# Patient Record
Sex: MALE | NOT HISPANIC OR LATINO | RURAL
[De-identification: names, ages, dates, MRNs, and addresses within clinical notes are randomized per-mention and may not be internally consistent; named-entity substitution may affect disease eponyms.]

---

## 2019-01-06 ENCOUNTER — IMMUNIZATION (OUTPATIENT)
Dept: RETAIL CLINIC | Facility: CLINIC | Age: 59
End: 2019-01-06

## 2019-01-06 DIAGNOSIS — Z23 NEED FOR VACCINATION: Primary | ICD-10-CM

## 2021-09-10 ENCOUNTER — TELEMEDICINE (OUTPATIENT)
Dept: FAMILY MEDICINE CLINIC | Facility: TELEHEALTH | Age: 61
End: 2021-09-10

## 2021-09-10 VITALS — TEMPERATURE: 101.3 F

## 2021-09-10 DIAGNOSIS — R50.9 FEVER, UNSPECIFIED FEVER CAUSE: ICD-10-CM

## 2021-09-10 DIAGNOSIS — R05.9 COUGH: ICD-10-CM

## 2021-09-10 DIAGNOSIS — R06.02 MILD SHORTNESS OF BREATH: ICD-10-CM

## 2021-09-10 DIAGNOSIS — U07.1 COVID-19: Primary | ICD-10-CM

## 2021-09-10 PROCEDURE — 99213 OFFICE O/P EST LOW 20 MIN: CPT | Performed by: NURSE PRACTITIONER

## 2021-09-10 RX ORDER — PREDNISONE 10 MG/1
TABLET ORAL DAILY
Qty: 21 EACH | Refills: 0 | Status: SHIPPED | OUTPATIENT
Start: 2021-09-10 | End: 2021-09-16

## 2021-09-10 RX ORDER — LISINOPRIL 10 MG/1
10 TABLET ORAL DAILY
COMMUNITY
End: 2023-03-13 | Stop reason: HOSPADM

## 2021-09-10 RX ORDER — ATORVASTATIN CALCIUM 20 MG/1
20 TABLET, FILM COATED ORAL DAILY
COMMUNITY
End: 2023-03-13 | Stop reason: HOSPADM

## 2021-09-10 RX ORDER — METOPROLOL SUCCINATE 25 MG/1
25 TABLET, EXTENDED RELEASE ORAL DAILY
COMMUNITY
End: 2023-03-13 | Stop reason: HOSPADM

## 2021-09-10 RX ORDER — ALBUTEROL SULFATE 90 UG/1
2 AEROSOL, METERED RESPIRATORY (INHALATION) EVERY 4 HOURS PRN
Qty: 18 G | Refills: 0 | Status: SHIPPED | OUTPATIENT
Start: 2021-09-10 | End: 2023-03-13 | Stop reason: HOSPADM

## 2021-09-10 RX ORDER — AZITHROMYCIN 250 MG/1
TABLET, FILM COATED ORAL
Qty: 6 TABLET | Refills: 0 | Status: SHIPPED | OUTPATIENT
Start: 2021-09-10 | End: 2023-03-13 | Stop reason: HOSPADM

## 2021-09-10 NOTE — PROGRESS NOTES
CHIEF COMPLAINT  Chief Complaint   Patient presents with   • Fever     covid 19   • Cough         HPI  Hughsarah Moses is a 62 y.o. male  presents with complaint of fever that will not break present for 10 days. He reports a diagnosis of covid 19 10 days ago and he has kept an elevated temperature despite tylenol alternated with IBU . His max temp as been 101.3. He reports sleeping most all day with no energy and body aches. His oxygenation level is staying above 90% and he is coughing with yellow production and mild short of breath. He reports little to no wheezing. He was curious about the drug Ivermectin. He thought he might get treated with this and azithromycin and prednisone. He sleeps with a cpap mask for ARIADNE but he has not used an inhaler for the shortness of breath.     Review of Systems   Constitutional: Positive for activity change, fatigue and fever.   HENT: Negative.    Eyes: Negative.    Respiratory: Positive for cough and shortness of breath. Negative for wheezing and stridor.    Cardiovascular: Negative.    Musculoskeletal: Negative.    Skin: Negative.    Allergic/Immunologic: Negative.    Neurological: Negative.    Hematological: Negative.    Psychiatric/Behavioral: Negative.        Past Medical History:   Diagnosis Date   • Hyperlipidemia    • Hypertension        History reviewed. No pertinent family history.    Social History     Socioeconomic History   • Marital status:      Spouse name: Not on file   • Number of children: Not on file   • Years of education: Not on file   • Highest education level: Not on file         Temp (!) 101.3 °F (38.5 °C)     PHYSICAL EXAM  Physical Exam   Constitutional: He is oriented to person, place, and time. He appears well-developed and well-nourished. He does not have a sickly appearance. He does not appear ill. No distress.   HENT:   Head: Normocephalic and atraumatic.   Pulmonary/Chest: Effort normal. No stridor.  No respiratory distress. He no  audible wheeze...  Coughing thru out the visit with mild shortness of breath.    Neurological: He is alert and oriented to person, place, and time.   Psychiatric: He has a normal mood and affect.   Vitals reviewed.      No results found for this or any previous visit.    Diagnoses and all orders for this visit:    1. COVID-19 (Primary)  -     QUESTIONNAIRE SERIES    2. Fever, unspecified fever cause  -     azithromycin (Zithromax Z-Sher) 250 MG tablet; Take 2 tablets by mouth on day 1, then 1 tablet daily on days 2-5  Dispense: 6 tablet; Refill: 0    3. Cough  -     predniSONE (DELTASONE) 10 MG (21) dose pack; Take  by mouth Daily for 6 days.  Dispense: 21 each; Refill: 0    4. Mild shortness of breath  -     albuterol sulfate  (90 Base) MCG/ACT inhaler; Inhale 2 puffs Every 4 (Four) Hours As Needed for Wheezing.  Dispense: 18 g; Refill: 0  -     predniSONE (DELTASONE) 10 MG (21) dose pack; Take  by mouth Daily for 6 days.  Dispense: 21 each; Refill: 0    Continue plan of care with IBU every 6 hours alternating with Tylenol as directed.   Increase fluids and rest.   For worsening s/s go to the ED for evaluation  Treating empiracally today with antibiotic and steroids. Patient to follow up prn worsening s/s or no improvement in 5-7 days.       FOLLOW-UP  As discussed during visit with PCP/[facility] if no improvement or Urgent Care/Emergency Department if worsening of symptoms    Patient verbalizes understanding of medication dosage, comfort measures, instructions for treatment and follow-up.    KVNG Banks  09/10/2021  14:43 EDT    This visit was performed via Telehealth.  This patient has been instructed to follow-up with their primary care provider if their symptoms worsen or the treatment provided does not resolve their illness.

## 2021-09-10 NOTE — PATIENT INSTRUCTIONS
10 Things You Can Do to Manage Your COVID-19 Symptoms at Home  If you have possible or confirmed COVID-19:  1. Stay home except to get medical care.  2. Monitor your symptoms carefully. If your symptoms get worse, call your healthcare provider immediately.  3. Get rest and stay hydrated.  4. If you have a medical appointment, call the healthcare provider ahead of time and tell them that you have or may have COVID-19.  5. For medical emergencies, call 911 and notify the dispatch personnel that you have or may have COVID-19.  6. Cover your cough and sneezes with a tissue or use the inside of your elbow.  7. Wash your hands often with soap and water for at least 20 seconds or clean your hands with an alcohol-based hand  that contains at least 60% alcohol.  8. As much as possible, stay in a specific room and away from other people in your home. Also, you should use a separate bathroom, if available. If you need to be around other people in or outside of the home, wear a mask.  9. Avoid sharing personal items with other people in your household, like dishes, towels, and bedding.  10. Clean all surfaces that are touched often, like counters, tabletops, and doorknobs. Use household cleaning sprays or wipes according to the label instructions.  cdc.gov/coronavirus  07/16/2021  This information is not intended to replace advice given to you by your health care provider. Make sure you discuss any questions you have with your health care provider.  Document Revised: 08/04/2021 Document Reviewed: 08/04/2021  [employer] Patient Education © 2021 [employer]    Fever, Adult         A fever is an increase in your body's temperature. It often means a temperature of 100.4°F (38°C) or higher. Brief mild or moderate fevers often have no long-term effects. They often do not need treatment. Moderate or high fevers may make you feel uncomfortable. Sometimes, they can be a sign of a serious illness or disease. A fever that keeps  coming back or that lasts a long time may cause you to lose water in your body (get dehydrated).  You can take your temperature with a thermometer to see if you have a fever. Temperature can change with:  · Age.  · Time of day.  · Where the thermometer is put in the body. Readings may vary when the thermometer is put:  ? In the mouth (oral).  ? In the butt (rectal).  ? In the ear (tympanic).  ? Under the arm (axillary).  ? On the forehead (temporal).  Follow these instructions at home:  Medicines  · Take over-the-counter and prescription medicines only as told by your doctor. Follow the dosing instructions carefully.  · If you were prescribed an antibiotic medicine, take it as told by your doctor. Do not stop taking it even if you start to feel better.  General instructions  · Watch for any changes in your symptoms. Tell your doctor about them.  · Rest as needed.  · Drink enough fluid to keep your pee (urine) pale yellow.  · Sponge yourself or bathe with room-temperature water as needed. This helps to lower your body temperature. Do not use ice water.  · Do not use too many blankets or wear clothes that are too heavy.  · If your fever was caused by an infection that spreads from person to person (is contagious), such as a cold or the flu:  ? You should stay home from work and public places for at least 24 hours after your fever is gone.  ? Your fever should be gone for at least 24 hours without the need to use medicines.  Contact a doctor if:  · You throw up (vomit).  · You cannot eat or drink without throwing up.  · You have watery poop (diarrhea).  · It hurts when you pee.  · Your symptoms do not get better with treatment.  · You have new symptoms.  · You feel very weak.  Get help right away if:  · You are short of breath or have trouble breathing.  · You are dizzy or you pass out (faint).  · You feel mixed up (confused).  · You have signs of not having enough water in your body, such as:  ? Dark pee, very little  pee, or no pee.  ? Cracked lips.  ? Dry mouth.  ? Sunken eyes.  ? Sleepiness.  ? Weakness.  · You have very bad pain in your belly (abdomen).  · You keep throwing up or having watery poop.  · You have a rash on your skin.  · Your symptoms get worse all of a sudden.  Summary  · A fever is an increase in your body's temperature. It often means a temperature of 100.4°F (38°C) or higher.  · Watch for any changes in your symptoms. Tell your doctor about them.  · Take all medicines only as told by your doctor.  · Do not go to work or other public places if your fever was caused by an illness that can spread to other people.  · Get help right away if you have signs that you do not have enough water in your body.  This information is not intended to replace advice given to you by your health care provider. Make sure you discuss any questions you have with your health care provider.  Document Revised: 06/03/2019 Document Reviewed: 06/03/2019  [employer] Patient Education © 2021 [employer]    Shortness of Breath, Adult  Shortness of breath means you have trouble breathing. Shortness of breath could be a sign of a medical problem.  Follow these instructions at home:    · Watch for any changes in your symptoms.  · Do not use any products that contain nicotine or tobacco, such as cigarettes, e-cigarettes, and chewing tobacco.  · Do not smoke. Smoking can cause shortness of breath. If you need help to quit smoking, ask your doctor.  · Avoid things that can make it harder to breathe, such as:  ? Mold.  ? Dust.  ? Air pollution.  ? Chemical smells.  ? Things that can cause allergy symptoms (allergens), if you have allergies.  · Keep your living space clean. Use products that help remove mold and dust.  · Rest as needed. Slowly return to your normal activities.  · Take over-the-counter and prescription medicines only as told by your doctor. This includes oxygen therapy and inhaled medicines.  · Keep all follow-up visits as told by  your doctor. This is important.  Contact a doctor if:  · Your condition does not get better as soon as expected.  · You have a hard time doing your normal activities, even after you rest.  · You have new symptoms.  Get help right away if:  · Your shortness of breath gets worse.  · You have trouble breathing when you are resting.  · You feel light-headed or you pass out (faint).  · You have a cough that is not helped by medicines.  · You cough up blood.  · You have pain with breathing.  · You have pain in your chest, arms, shoulders, or belly (abdomen).  · You have a fever.  · You cannot walk up stairs.  · You cannot exercise the way you normally do.  These symptoms may represent a serious problem that is an emergency. Do not wait to see if the symptoms will go away. Get medical help right away. Call your local emergency services (911 in the [place] Do not drive yourself to the hospital.  Summary  · Shortness of breath is when you have trouble breathing enough air. It can be a sign of a medical problem.  · Avoid things that make it hard for you to breathe, such as smoking, pollution, mold, and dust.  · Watch for any changes in your symptoms. Contact your doctor if you do not get better or you get worse.  This information is not intended to replace advice given to you by your health care provider. Make sure you discuss any questions you have with your health care provider.  Document Revised: 05/20/2019 Document Reviewed: 05/20/2019  [employer] Patient Education © 2021 [employer]    Cough, Adult  A cough helps to clear your throat and lungs. A cough may be a sign of an illness or another medical condition.  An acute cough may only last 2-3 weeks, while a chronic cough may last 8 or more weeks.  Many things can cause a cough. They include:  · Germs (viruses or bacteria) that attack the airway.  · Breathing in things that bother (irritate) your lungs.  · Allergies.  · Asthma.  · Mucus that runs down the back of your  throat (postnasal drip).  · Smoking.  · Acid backing up from the stomach into the tube that moves food from the mouth to the stomach (gastroesophageal reflux).  · Some medicines.  · Lung problems.  · Other medical conditions, such as heart failure or a blood clot in the lung (pulmonary embolism).  Follow these instructions at home:  Medicines  · Take over-the-counter and prescription medicines only as told by your doctor.  · Talk with your doctor before you take medicines that stop a cough (cough suppressants).  Lifestyle    · Do not smoke, and try not to be around smoke. Do not use any products that contain nicotine or tobacco, such as cigarettes, e-cigarettes, and chewing tobacco. If you need help quitting, ask your doctor.  · Drink enough fluid to keep your pee (urine) pale yellow.  · Avoid caffeine.  · Do not drink alcohol if your doctor tells you not to drink.    General instructions    · Watch for any changes in your cough. Tell your doctor about them.  · Always cover your mouth when you cough.  · Stay away from things that make you cough, such as perfume, candles, campfire smoke, or cleaning products.  · If the air is dry, use a cool mist vaporizer or humidifier in your home.  · If your cough is worse at night, try using extra pillows to raise your head up higher while you sleep.  · Rest as needed.  · Keep all follow-up visits as told by your doctor. This is important.    Contact a doctor if:  · You have new symptoms.  · You cough up pus.  · Your cough does not get better after 2-3 weeks, or your cough gets worse.  · Cough medicine does not help your cough and you are not sleeping well.  · You have pain that gets worse or pain that is not helped with medicine.  · You have a fever.  · You are losing weight and you do not know why.  · You have night sweats.  Get help right away if:  · You cough up blood.  · You have trouble breathing.  · Your heartbeat is very fast.  These symptoms may be an emergency. Do not  wait to see if the symptoms will go away. Get medical help right away. Call your local emergency services (911 in the [place] Do not drive yourself to the hospital.  Summary  · A cough helps to clear your throat and lungs. Many things can cause a cough.  · Take over-the-counter and prescription medicines only as told by your doctor.  · Always cover your mouth when you cough.  · Contact a doctor if you have new symptoms or you have a cough that does not get better or gets worse.  This information is not intended to replace advice given to you by your health care provider. Make sure you discuss any questions you have with your health care provider.  Document Revised: 02/05/2021 Document Reviewed: 01/06/2020  [employer] Patient Education © 2021 [employer]    Cough, Adult  A cough helps to clear your throat and lungs. A cough may be a sign of an illness or another medical condition.  An acute cough may only last 2-3 weeks, while a chronic cough may last 8 or more weeks.  Many things can cause a cough. They include:  Germs (viruses or bacteria) that attack the airway.  Breathing in things that bother (irritate) your lungs.  Allergies.  Asthma.  Mucus that runs down the back of your throat (postnasal drip).  Smoking.  Acid backing up from the stomach into the tube that moves food from the mouth to the stomach (gastroesophageal reflux).  Some medicines.  Lung problems.  Other medical conditions, such as heart failure or a blood clot in the lung (pulmonary embolism).  Follow these instructions at home:  Medicines  Take over-the-counter and prescription medicines only as told by your doctor.  Talk with your doctor before you take medicines that stop a cough (cough suppressants).  Lifestyle    Do not smoke, and try not to be around smoke. Do not use any products that contain nicotine or tobacco, such as cigarettes, e-cigarettes, and chewing tobacco. If you need help quitting, ask your doctor.  Drink enough fluid to keep your  pee (urine) pale yellow.  Avoid caffeine.  Do not drink alcohol if your doctor tells you not to drink.    General instructions    Watch for any changes in your cough. Tell your doctor about them.  Always cover your mouth when you cough.  Stay away from things that make you cough, such as perfume, candles, campfire smoke, or cleaning products.  If the air is dry, use a cool mist vaporizer or humidifier in your home.  If your cough is worse at night, try using extra pillows to raise your head up higher while you sleep.  Rest as needed.  Keep all follow-up visits as told by your doctor. This is important.    Contact a doctor if:  You have new symptoms.  You cough up pus.  Your cough does not get better after 2-3 weeks, or your cough gets worse.  Cough medicine does not help your cough and you are not sleeping well.  You have pain that gets worse or pain that is not helped with medicine.  You have a fever.  You are losing weight and you do not know why.  You have night sweats.  Get help right away if:  You cough up blood.  You have trouble breathing.  Your heartbeat is very fast.  These symptoms may be an emergency. Do not wait to see if the symptoms will go away. Get medical help right away. Call your local emergency services (911 in the [place] Do not drive yourself to the hospital.  Summary  A cough helps to clear your throat and lungs. Many things can cause a cough.  Take over-the-counter and prescription medicines only as told by your doctor.  Always cover your mouth when you cough.  Contact a doctor if you have new symptoms or you have a cough that does not get better or gets worse.  This information is not intended to replace advice given to you by your health care provider. Make sure you discuss any questions you have with your health care provider.  Document Revised: 02/05/2021 Document Reviewed: 01/06/2020  [employer] Patient Education © 2021 [employer]

## 2022-09-13 PROBLEM — I10 HTN (HYPERTENSION): Status: ACTIVE | Noted: 2022-09-13

## 2022-09-13 PROBLEM — R41.89 OTHER SYMPTOMS AND SIGNS INVOLVING COGNITIVE FUNCTIONS AND AWARENESS: Status: ACTIVE | Noted: 2022-09-13

## 2022-09-13 PROBLEM — Z95.5 PRESENCE OF CORONARY ANGIOPLASTY IMPLANT AND GRAFT: Status: ACTIVE | Noted: 2022-09-13

## 2022-09-13 PROBLEM — G47.01 INSOMNIA DUE TO MEDICAL CONDITION: Status: ACTIVE | Noted: 2022-09-13

## 2022-09-13 PROBLEM — J45.21 MILD INTERMITTENT ASTHMA WITH (ACUTE) EXACERBATION: Status: ACTIVE | Noted: 2022-09-13

## 2022-09-13 PROBLEM — M54.50 LOW BACK PAIN: Status: ACTIVE | Noted: 2022-09-13

## 2022-09-13 PROBLEM — I25.10 ATHSCL HEART DISEASE OF NATIVE CORONARY ARTERY W/O ANG PCTRS: Status: ACTIVE | Noted: 2022-09-13

## 2022-09-13 PROBLEM — G47.33 OBSTRUCTIVE SLEEP APNEA: Status: ACTIVE | Noted: 2022-09-13

## 2022-09-13 PROBLEM — D17.1 LIPOMA OF BACK: Status: ACTIVE | Noted: 2022-09-13

## 2022-09-13 PROBLEM — E78.5 HLD (HYPERLIPIDEMIA): Status: ACTIVE | Noted: 2022-09-13

## 2022-09-13 PROBLEM — M10.9 GOUT, UNSPECIFIED: Status: ACTIVE | Noted: 2022-09-13

## 2022-09-13 PROBLEM — M54.16 RADICULOPATHY, LUMBAR REGION: Status: ACTIVE | Noted: 2022-09-13

## 2022-09-13 PROBLEM — I50.32 CHRONIC DIASTOLIC (CONGESTIVE) HEART FAILURE (HCC): Status: ACTIVE | Noted: 2022-09-13

## 2022-09-13 PROBLEM — U09.9 POST COVID-19 CONDITION, UNSPECIFIED: Status: ACTIVE | Noted: 2022-09-13

## 2022-09-13 PROBLEM — E66.9 NON MORBID OBESITY: Status: ACTIVE | Noted: 2022-09-13

## 2022-09-14 ENCOUNTER — OFFICE VISIT (OUTPATIENT)
Dept: FAMILY MEDICINE CLINIC | Facility: CLINIC | Age: 62
End: 2022-09-14

## 2022-09-14 VITALS
DIASTOLIC BLOOD PRESSURE: 80 MMHG | HEIGHT: 70 IN | WEIGHT: 279 LBS | HEART RATE: 101 BPM | BODY MASS INDEX: 39.94 KG/M2 | RESPIRATION RATE: 18 BRPM | TEMPERATURE: 98.7 F | OXYGEN SATURATION: 96 % | SYSTOLIC BLOOD PRESSURE: 130 MMHG

## 2022-09-14 DIAGNOSIS — M25.511 ACUTE PAIN OF RIGHT SHOULDER: Primary | ICD-10-CM

## 2022-09-14 DIAGNOSIS — M25.562 ACUTE PAIN OF LEFT KNEE: ICD-10-CM

## 2022-09-14 PROCEDURE — 99213 OFFICE O/P EST LOW 20 MIN: CPT | Performed by: INTERNAL MEDICINE

## 2022-09-14 RX ORDER — PREDNISONE 20 MG/1
20 TABLET ORAL DAILY
Qty: 10 TABLET | Refills: 0 | Status: SHIPPED | OUTPATIENT
Start: 2022-09-14 | End: 2022-12-23

## 2022-09-14 RX ORDER — ESZOPICLONE 2 MG/1
TABLET, FILM COATED ORAL
COMMUNITY
Start: 2022-08-10

## 2022-09-14 RX ORDER — LISINOPRIL 40 MG/1
TABLET ORAL
COMMUNITY
End: 2022-12-23

## 2022-09-14 RX ORDER — ASPIRIN 81 MG/1
TABLET ORAL
COMMUNITY

## 2022-09-14 RX ORDER — CLOPIDOGREL BISULFATE 75 MG/1
75 TABLET ORAL DAILY
COMMUNITY
Start: 2022-07-12

## 2022-09-14 RX ORDER — ATORVASTATIN CALCIUM 80 MG/1
TABLET, FILM COATED ORAL
COMMUNITY

## 2022-09-14 RX ORDER — METOPROLOL SUCCINATE 50 MG/1
TABLET, EXTENDED RELEASE ORAL
COMMUNITY

## 2022-09-14 RX ORDER — NITROGLYCERIN 0.4 MG/1
0.4 TABLET SUBLINGUAL
COMMUNITY

## 2022-09-14 RX ORDER — BENZONATATE 200 MG/1
200 CAPSULE ORAL 3 TIMES DAILY PRN
COMMUNITY
Start: 2022-08-17 | End: 2022-12-23

## 2022-09-14 NOTE — PROGRESS NOTES
"    Follow Up Office Visit      Date: 2022   Patient Name: Ethan Moses  : 1960   MRN: [id number]     Chief Complaint:    Chief Complaint   Patient presents with   • right shoulder pain   • left knee pain     Patient fell labor day weekend        History of Present Illness: Ethan Moses is a 62 y.o. male who is here today for follow-up of a fall sustained 9 days ago when he slipped on his porch, landing on his right shoulder, also slightly hurting his left knee.  He had significant stiffness in the shoulder and pain, stiffness improving but still having significant discomfort especially with movement of the shoulder such as abduction and rotation.  His left knee is mildly tender but really not a major problem at this stage.  He had been getting some physical therapy for some generalized \"stiffness of the right shoulder without a lot of pain, having just completed his last round of therapy before his fall.  The pain is now significantly flared up now as noted given his fall.  Taking some Tylenol ibuprofen initially ice followed by heat to the shoulder.  No other acute problems..      Subjective      Review of Systems:   Review of Systems    I have reviewed the patients family history, social history, past medical history, past surgical history and have updated it as appropriate.     Medications:     Current Outpatient Medications:   •  aspirin 81 MG EC tablet, Low Dose Aspirin, Disp: , Rfl:   •  atorvastatin (LIPITOR) 80 MG tablet, atorvastatin 80 mg tablet  Take 1 tablet every day by oral route., Disp: , Rfl:   •  benzonatate (TESSALON) 200 MG capsule, Take 200 mg by mouth 3 (Three) Times a Day As Needed., Disp: , Rfl:   •  clopidogrel (PLAVIX) 75 MG tablet, Take 75 mg by mouth Daily., Disp: , Rfl:   •  eszopiclone (LUNESTA) 2 MG tablet, TAKE 1 TABLET IMMEDIATELY BEFORE BEDTIME ONCE A DAY FOR 30 DAYS, Disp: , Rfl:   •  lisinopril (PRINIVIL,ZESTRIL) 40 MG tablet, lisinopril 40 mg tablet  Take 1 " "tablet every day by oral route., Disp: , Rfl:   •  metoprolol succinate XL (TOPROL-XL) 50 MG 24 hr tablet, metoprolol succinate ER 50 mg tablet,extended release 24 hr  Take 1 tablet every day by oral route., Disp: , Rfl:   •  nitroglycerin (NITROSTAT) 0.4 MG SL tablet, Place 0.4 mg under the tongue Every 5 (Five) Minutes As Needed. Take no more than 3 doses in 15 minutes., Disp: , Rfl:   •  predniSONE (DELTASONE) 20 MG tablet, Take 1 tablet by mouth Daily., Disp: 10 tablet, Rfl: 0    Allergies:   Allergies   Allergen Reactions   • Contrast Dye Hives       Objective     Physical Exam: Please see above  Vital Signs:   Vitals:    09/14/22 0851   BP: 130/80   Pulse: 101   Resp: 18   Temp: 98.7 °F (37.1 °C)   SpO2: 96%   Weight: 127 kg (279 lb)   Height: 177.8 cm (70\")     Body mass index is 40.03 kg/m².  Class 3 Severe Obesity (BMI >=40). Obesity-related health conditions include the following: hypertension, coronary heart disease and dyslipidemias. Obesity is unchanged. BMI is is above average; BMI management plan is completed. We discussed low calorie, low carb based diet program, portion control and increasing exercise.       Physical Exam  General: Very pleasant taller statured obese healthy-appearing 62-year-old white male.  Affect very appropriate.  No acute distress.  Neck with no palpable tenderness or masses, increased circumference, range of motion intact with no discomfort,  Lungs clear  Cardiac regular rate rhythm with no murmurs gallops or rubs  Musculoskeletal exam reveals unremarkable neck exam is noted, right shoulder joint margin tenderness to palpation diffusely, more laterally and anteriorly, with positive Neer sign and positive Gaston sign, left shoulder unremarkable, left knee with very minimal tenderness to palpation on the left lateral joint margin, with negative Iain sign, no pain to stressing the medial or lateral collateral ligaments, negative anterior and posterior drawer sign, no " "effusions or swelling, range of motion completely normal.  Procedures    Results:   Labs:   No results found for: HGBA1C, CMP, CBCDIFFPANEL, CREAT, TSH     POCT Results (if applicable):   No results found for this or any previous visit.    Imaging:   No valid procedures specified.     Assessment / Plan      Assessment/Plan:   Diagnoses and all orders for this visit:    1. Acute pain of right shoulder (Primary)  -     predniSONE (DELTASONE) 20 MG tablet; Take 1 tablet by mouth Daily.  Dispense: 10 tablet; Refill: 0  Posttraumatic right shoulder pain.  Previously having \"stiffness\" having completed physical therapy but indicated he really did not have a lot of pain at the time.  Suspicion for rotator cuff injury.  Initially we will treat conservatively with prednisone along with ibuprofen and Tylenol.  If clinically not improving over the next couple weeks, patient to advise me and we will reenroll him in another course of physical therapy, and certainly if this would not be beneficial, we will then proceed to MRI of the right shoulder.  Deferring plain film x-rays at this time given low clinical suspicion of any acute bony injury.  2. Acute pain of left knee  Very minimal nature, improving.  Likely mild strain.  Ibuprofen and Tylenol as needed.      Follow Up:   Return if symptoms worsen or fail to improve.      At [facility], we believe that sharing information builds trust and better relationships. You are receiving this note because you recently visited [facility]. It is possible you will see health information before a provider has talked with you about it. This kind of information can be easy to misunderstand. To help you fully understand what it means for your health, we urge you to discuss this note with your provider.    Vinay Ramesh MD  [facility]   "

## 2022-12-23 ENCOUNTER — OFFICE VISIT (OUTPATIENT)
Dept: FAMILY MEDICINE CLINIC | Facility: CLINIC | Age: 62
End: 2022-12-23

## 2022-12-23 VITALS
WEIGHT: 281.6 LBS | HEIGHT: 70 IN | SYSTOLIC BLOOD PRESSURE: 137 MMHG | HEART RATE: 80 BPM | TEMPERATURE: 97.3 F | DIASTOLIC BLOOD PRESSURE: 84 MMHG | OXYGEN SATURATION: 99 % | BODY MASS INDEX: 40.31 KG/M2

## 2022-12-23 DIAGNOSIS — E66.01 MORBID (SEVERE) OBESITY DUE TO EXCESS CALORIES: ICD-10-CM

## 2022-12-23 DIAGNOSIS — G89.29 CHRONIC MIDLINE LOW BACK PAIN WITH RIGHT-SIDED SCIATICA: ICD-10-CM

## 2022-12-23 DIAGNOSIS — N40.1 BPH ASSOCIATED WITH NOCTURIA: ICD-10-CM

## 2022-12-23 DIAGNOSIS — M54.2 CERVICALGIA: ICD-10-CM

## 2022-12-23 DIAGNOSIS — I50.32 CHRONIC DIASTOLIC (CONGESTIVE) HEART FAILURE: ICD-10-CM

## 2022-12-23 DIAGNOSIS — G62.9 PERIPHERAL POLYNEUROPATHY: ICD-10-CM

## 2022-12-23 DIAGNOSIS — I10 PRIMARY HYPERTENSION: ICD-10-CM

## 2022-12-23 DIAGNOSIS — G47.01 INSOMNIA DUE TO MEDICAL CONDITION: ICD-10-CM

## 2022-12-23 DIAGNOSIS — Z95.5 PRESENCE OF CORONARY ANGIOPLASTY IMPLANT AND GRAFT: ICD-10-CM

## 2022-12-23 DIAGNOSIS — Z11.4 SCREENING FOR HIV (HUMAN IMMUNODEFICIENCY VIRUS): ICD-10-CM

## 2022-12-23 DIAGNOSIS — Z23 NEED FOR PROPHYLACTIC VACCINATION AGAINST STREPTOCOCCUS PNEUMONIAE (PNEUMOCOCCUS): ICD-10-CM

## 2022-12-23 DIAGNOSIS — U09.9 POST-COVID SYNDROME: ICD-10-CM

## 2022-12-23 DIAGNOSIS — Z00.01 ENCOUNTER FOR GENERAL ADULT MEDICAL EXAMINATION WITH ABNORMAL FINDINGS: Primary | ICD-10-CM

## 2022-12-23 DIAGNOSIS — G47.33 OBSTRUCTIVE SLEEP APNEA: ICD-10-CM

## 2022-12-23 DIAGNOSIS — M54.41 CHRONIC MIDLINE LOW BACK PAIN WITH RIGHT-SIDED SCIATICA: ICD-10-CM

## 2022-12-23 DIAGNOSIS — Z12.5 SCREENING FOR PROSTATE CANCER: ICD-10-CM

## 2022-12-23 DIAGNOSIS — I25.10 ATHSCL HEART DISEASE OF NATIVE CORONARY ARTERY W/O ANG PCTRS: ICD-10-CM

## 2022-12-23 DIAGNOSIS — R35.1 BPH ASSOCIATED WITH NOCTURIA: ICD-10-CM

## 2022-12-23 DIAGNOSIS — R53.83 OTHER FATIGUE: ICD-10-CM

## 2022-12-23 DIAGNOSIS — N18.31 STAGE 3A CHRONIC KIDNEY DISEASE: ICD-10-CM

## 2022-12-23 DIAGNOSIS — Z11.59 NEED FOR HEPATITIS C SCREENING TEST: ICD-10-CM

## 2022-12-23 DIAGNOSIS — E78.2 MIXED HYPERLIPIDEMIA: ICD-10-CM

## 2022-12-23 DIAGNOSIS — Z87.39 HISTORY OF GOUT: ICD-10-CM

## 2022-12-23 PROBLEM — E66.9 OBESITY (BMI 35.0-39.9 WITHOUT COMORBIDITY): Status: ACTIVE | Noted: 2022-12-20

## 2022-12-23 PROBLEM — E66.9 NON MORBID OBESITY: Status: RESOLVED | Noted: 2022-09-13 | Resolved: 2022-12-23

## 2022-12-23 PROBLEM — E66.9 OBESITY (BMI 35.0-39.9 WITHOUT COMORBIDITY): Status: RESOLVED | Noted: 2022-12-20 | Resolved: 2022-12-23

## 2022-12-23 PROCEDURE — 90471 IMMUNIZATION ADMIN: CPT | Performed by: INTERNAL MEDICINE

## 2022-12-23 PROCEDURE — 99396 PREV VISIT EST AGE 40-64: CPT | Performed by: INTERNAL MEDICINE

## 2022-12-23 PROCEDURE — 90677 PCV20 VACCINE IM: CPT | Performed by: INTERNAL MEDICINE

## 2022-12-23 PROCEDURE — 36415 COLL VENOUS BLD VENIPUNCTURE: CPT | Performed by: INTERNAL MEDICINE

## 2022-12-23 RX ORDER — LISINOPRIL AND HYDROCHLOROTHIAZIDE 25; 20 MG/1; MG/1
1 TABLET ORAL DAILY
COMMUNITY

## 2022-12-23 RX ORDER — GABAPENTIN 300 MG/1
300 CAPSULE ORAL 3 TIMES DAILY
COMMUNITY
Start: 2022-12-20 | End: 2023-01-19

## 2022-12-23 NOTE — PROGRESS NOTES
Male Physical Note      Date: 2022   Patient Name: Ethan Moses  : 1960   MRN: [id number]     Chief Complaint:    Chief Complaint   Patient presents with   • Annual Exam     WANTS BLOOD WORK DONE       History of Present Illness: Ethan Moses is a 62 y.o. male who is here today for their annual health maintenance and physical.  Pleasant 62-year-old white male previous patient of Dr. Tyshawn Ramesh who subsequently retired, coming in for routine complete physical.  He was diagnosed with COVID-19 in 2021 and subsequent long COVID syndrome manifested by some brain fog, headaches, and initially having some mild respiratory symptoms.  He was referred initially to Dr. Nohemy Wing, and local neurologist, and ultimately Dr. Joana Romero of  neurology, given his long COVID symptoms.  Diagnosed with a nonspecific peripheral neuropathy in his lower extremities manifested by some tingling and burning in his feet, just recently having been prescribed gabapentin 300 mg to take up to 3 times daily but initially nightly.  He has been getting some physical therapy focusing on cervicalgia which has helped his headaches and he does believe also helping his brain fog.  Dr. Romero has requested some labs to pursue his etiology of his neuropathy, patient bring in a copy of these labs requested.  Patient unrelated has a history of coronary artery disease with stent placement having undergone left heart catheterization with Dr. Connolly in 2021 revealing multivessel noncritical disease with prior RCA/PDA stent placed revealing patency, normal ejection fraction.  Did recently see the office of Dr. Post of cardiology where he had a switching lisinopril over to lisinopril/HCTZ given some swelling in his hand.  He is not having any chest pains palpitations or dyspnea at this time and no significant dependent edema.  He did have a CT scan performed of his chest in 2022 performed given a prior COVID-pneumonia and some  dyspnea, this revealing hazy bibasilar groundglass opacities possibly reflecting sequelae of COVID-19 pneumonia.  There was no specific protocol for follow-up on this.  He is again not having any dyspnea or cough at this time.  History of hypertension with blood pressures generally normal.  History of chronic kidney disease.  Has never been diagnosed with diabetes.  Does have chronic insomnia for which he has been given sporadic Lunesta 2 mg nightly, patient only using it several times weekly, also history of ARIADNE on CPAP machine followed by Dr. Post for this as well as his cardiology, Dr. Post imminently retiring, history of obesity for which he has no specific exercise protocol and does have a moderately suboptimal diet.  History of some chronic lumbago with intermittent right sciatica not lifestyle limiting.  Recently had been seen for some traumatic right shoulder pain which is essentially almost completely resolved.      Subjective      Review of Systems:   Review of Systems    Past Medical History, Social History, Family History and Care Team were all reviewed with patient and updated as appropriate.     Medications:     Current Outpatient Medications:   •  aspirin 81 MG EC tablet, Low Dose Aspirin, Disp: , Rfl:   •  atorvastatin (LIPITOR) 80 MG tablet, atorvastatin 80 mg tablet  Take 1 tablet every day by oral route., Disp: , Rfl:   •  clopidogrel (PLAVIX) 75 MG tablet, Take 75 mg by mouth Daily., Disp: , Rfl:   •  eszopiclone (LUNESTA) 2 MG tablet, TAKE 1 TABLET IMMEDIATELY BEFORE BEDTIME ONCE A DAY FOR 30 DAYS, Disp: , Rfl:   •  gabapentin (NEURONTIN) 300 MG capsule, Take 300 mg by mouth 3 (Three) Times a Day., Disp: , Rfl:   •  lisinopril-hydrochlorothiazide (PRINZIDE,ZESTORETIC) 20-25 MG per tablet, Take 1 tablet by mouth Daily., Disp: , Rfl:   •  metoprolol succinate XL (TOPROL-XL) 50 MG 24 hr tablet, metoprolol succinate ER 50 mg tablet,extended release 24 hr  Take 1 tablet every day by oral route.,  Disp: , Rfl:   •  nitroglycerin (NITROSTAT) 0.4 MG SL tablet, Place 0.4 mg under the tongue Every 5 (Five) Minutes As Needed. Take no more than 3 doses in 15 minutes., Disp: , Rfl:     Allergies:   Allergies   Allergen Reactions   • Contrast Dye Hives       Immunizations:  Health Maintenance Summary          Overdue - ZOSTER VACCINE (1 of 2) Overdue - never done    No completion, postpone, or frequency change history exists for this topic.          Ordered - HEPATITIS C SCREENING (Once) Ordered on 12/23/2022    No completion, postpone, or frequency change history exists for this topic.          Overdue - ANNUAL PHYSICAL (Yearly) Overdue - never done    No completion, postpone, or frequency change history exists for this topic.          Overdue - COVID-19 Vaccine (3 - Booster for Moderna series) Overdue since 3/21/2022    01/24/2022  Imm Admin: COVID-19 (MODERNA) 1st, 2nd, 3rd Dose Only    12/24/2021  Imm Admin: COVID-19 (MODERNA) 1st, 2nd, 3rd Dose Only          Overdue - INFLUENZA VACCINE (Yearly - August to March) Overdue since 8/1/2022 11/04/2019  Imm Admin: Influenza, Unspecified    01/06/2019  Imm Admin: [place] =>18yrs          Ordered - LIPID PANEL (Yearly) Ordered on 12/23/2022    No completion, postpone, or frequency change history exists for this topic.          COLORECTAL CANCER SCREENING (COLOGUARD - Every 3 Years) Next due on 1/27/2024 01/27/2021  SCANNED - COLOGUARD          TDAP/TD VACCINES (2 - Td or Tdap) Next due on 11/4/2029 11/04/2019  Imm Admin: Tdap          Pneumococcal Vaccine 0-64 (Series Information) Completed    12/23/2022  Imm Admin: Pneumococcal Conjugate 20-Valent (PCV20)                Orders Placed This Encounter   Procedures   • Pneumococcal Conjugate Vaccine 20-Valent All       Colorectal Screening:   Cologuard 1/2021 normal  Last Completed Colonoscopy          COLORECTAL CANCER SCREENING (COLOGUARD - Every 3 Years) Next due on 1/27/2024 01/27/2021  SCANNED -  "COLOGUARD              CT for Smoker (Age 50-80, 20pk yr within last 15 years): N/A  Bone Density/DEXA (high risk): N/A  Hep C (Age 18-79 once): Pending  HIV (Age 15-65 once) : Pending  PSA (Over age 50, C Level Recommendation): Pending  US Aorta (For male smokers, age 65): N/A  A1c: No results found for: HGBA1C pending  Lipid panel: No results found for: LABLIPI pending    The ASCVD Risk score (Bebeto BOSTON, et al., 2019) failed to calculate for the following reasons:    Cannot find a previous HDL lab    Cannot find a previous total cholesterol lab    Tobacco Use: Low Risk    • Smoking Tobacco Use: Never   • Smokeless Tobacco Use: Never   • Passive Exposure: Not on file       Social History     Substance and Sexual Activity   Alcohol Use Yes    Comment: In the 1980s, and early 1990s, he did use excessive alcohol.  Currently now a weekend drinker, typically 7-8 ounces of bourbon over the weekend        Social History     Substance and Sexual Activity   Drug Use Not Currently        Diet/Physical activity: Limited physical activity, suboptimal diet    Sexual health: No major concern, contraception used    PHQ-2 Depression Screening  PHQ-9 Total Score:   Denies being depressed       Objective     Physical Exam:  Vital Signs:   Vitals:    12/23/22 1045   BP: 137/84   BP Location: Left arm   Patient Position: Sitting   Cuff Size: Large Adult   Pulse: 80   Temp: 97.3 °F (36.3 °C)   TempSrc: Temporal   SpO2: 99%   Weight: 128 kg (281 lb 9.6 oz)   Height: 177.8 cm (70\")     Body mass index is 40.41 kg/m².     Physical Exam  Vitals and nursing note reviewed.   Constitutional:       Appearance: Normal appearance. He is obese.      Comments: No acute distress, alert and oriented, fluent speech average statured, BMI 40.4 with significant obesity   HENT:      Head: Normocephalic and atraumatic.      Right Ear: Tympanic membrane, ear canal and external ear normal.      Left Ear: Tympanic membrane, ear canal and external ear normal. "      Nose: Nose normal.      Mouth/Throat:      Mouth: Mucous membranes are moist.      Pharynx: Oropharynx is clear.   Eyes:      Extraocular Movements: Extraocular movements intact.      Conjunctiva/sclera: Conjunctivae normal.      Pupils: Pupils are equal, round, and reactive to light.   Neck:      Vascular: No carotid bruit.      Comments: Mild decreased range of motion to rotation and extension with good flexion  Cardiovascular:      Rate and Rhythm: Normal rate and regular rhythm.      Pulses: Normal pulses.      Heart sounds: Normal heart sounds. No murmur heard.    No friction rub. No gallop.      Comments: 2+ carotids without bruits, 2+ radial pulses, 2+ femoral pulses without bruits, 2+ bipedal pulses with good perfusion and no significant pitting edema having some very mild nonpitting stasis edema of his ankles bilaterally  Pulmonary:      Effort: Pulmonary effort is normal.      Breath sounds: Normal breath sounds.      Comments: No cough wheeze or dyspnea  Abdominal:      General: Bowel sounds are normal.      Palpations: Abdomen is soft.      Comments: Moderately obese soft and nontender with no organomegaly or masses and normal bowel sounds   Genitourinary:     Comments: Patient declines  or rectal examination  Musculoskeletal:         General: Tenderness present. No swelling or deformity. Normal range of motion.      Cervical back: Normal range of motion and neck supple. No tenderness.      Right lower leg: No edema.      Left lower leg: No edema.      Comments: Mild tenderness palpation on the midline of the spine at the junction of the lower thoracic and upper lumbar region   Lymphadenopathy:      Cervical: No cervical adenopathy.   Skin:     General: Skin is warm and dry.      Capillary Refill: Capillary refill takes less than 2 seconds.      Findings: No lesion or rash.   Neurological:      General: No focal deficit present.      Mental Status: He is alert and oriented to person, place, and  time. Mental status is at baseline.      Comments: Bipedal exam with 2+ DP and 2+ PT pulses bilaterally, mild nonpitting stasis edema of the ankles, decreased sensation of both feet to fine touch vibration pinprick, more so on the left than right foot, motor exam normal   Psychiatric:         Mood and Affect: Mood normal.         Behavior: Behavior normal.         Thought Content: Thought content normal.         Judgment: Judgment normal.          POCT Results (if applicable):   No results found for this or any previous visit.    Procedures    Assessment / Plan      Assessment/Plan:   Diagnoses and all orders for this visit:    1. Encounter for general adult medical examination with abnormal findings (Primary)  -     TSH Rfx On Abnormal To Free T4; Future  -     CBC & Differential; Future  -     Comprehensive Metabolic Panel; Future  -     Lipid Panel; Future  -     Hepatitis C Antibody; Future  -     HIV-1 / O / 2 Ag / Antibody 4th Generation; Future  -     Hemoglobin A1c; Future  -     Urinalysis With Culture If Indicated -; Future  -     Vitamin B12; Future  -     Folate; Future  -     PSA Screen; Future  -     Cancel: Methylmalonic Acid, Serum; Future  -     Cancel: Protein Elec + Interp, Serum; Future  -     Cancel: KAMLESH + PE; Future  -     Uric Acid; Future  -     Uric Acid  -     Cancel: KAMLESH + PE  -     Cancel: Protein Elec + Interp, Serum  -     Cancel: Methylmalonic Acid, Serum  -     PSA Screen  -     Folate  -     Vitamin B12  -     Hemoglobin A1c  -     HIV-1 / O / 2 Ag / Antibody 4th Generation  -     Hepatitis C Antibody  -     Lipid Panel  -     Urinalysis With Culture If Indicated -  -     Comprehensive Metabolic Panel  -     CBC & Differential  -     TSH Rfx On Abnormal To Free T4  -     Cancel: KAMLESH + PE; Future  -     Cancel: Protein Elec + Interp, Serum; Future  -     Cancel: Methylmalonic Acid, Serum; Future  -     Protein Elec + Interp, Serum; Future  -     Methylmalonic Acid, Serum; Future  -      KAMLESH + PE; Future  -     KAMLESH + PE  -     Methylmalonic Acid, Serum  -     Protein Elec + Interp, Serum    2. Athscl heart disease of native coronary artery w/o ang pctrs  History of multi vessel noncritical coronary disease with prior stent, most recent LHC from 11/2021 revealing patent stent and normal ejection fraction.  Patient is asymptomatic, continue risk factor modification including use of statin, beta-blocker, ACE inhibitor, Plavix and aspirin along with Nitrostat as needed, not requiring.  Patient indicates she just recently had an EKG performed through the office of Dr. Post, and given lack of symptoms I will defer on obtaining an EKG today simply obtaining a copy of the recent tracing.  3. Presence of coronary angioplasty implant and graft  Refer to above, on aspirin and Plavix.  Continue his risk factor modification.  4. Chronic diastolic (congestive) heart failure (HCC)  Recent initiation of low-dose lisinopril purely for some edema in the hands.  There is no evidence of clinical volume overload at this time.  5. Primary hypertension  Satisfactory control acutely as well as chronically, currently on a regimen of metoprolol XL 50 mg daily, and recent change in lisinopril 40 mg daily over the lisinopril/HCTZ 20/25 mg daily.  Continue current monitoring.  6. Mixed hyperlipidemia  Taking atorvastatin 80 mg nightly.  Update lipid profile  7. Stage 3a chronic kidney disease (HCC)  -     Comprehensive Metabolic Panel; Future  -     Comprehensive Metabolic Panel  Most recent creatinine 1.4 with a GFR 54 9/2021 with no more recent labs available for my perusal.  Is taking ACE inhibitor and just had addition of low-dose lisinopril.  Update renal function.  8. Morbid (severe) obesity due to excess calories (HCC)  Luis Daniel discussion with patient regarding need to initiate regular daily exercise as well as pursue a low calorie healthy balanced diet in order to reduce his risks.  9. History of gout  -     Uric Acid;  Future  -     Uric Acid  History of prior gouty arthritis in his left foot.  Update uric acid.  10. Peripheral polyneuropathy  -     Hemoglobin A1c; Future  -     Vitamin B12; Future  -     Folate; Future  -     Cancel: Methylmalonic Acid, Serum; Future  -     Cancel: Protein Elec + Interp, Serum; Future  -     Cancel: KAMLESH + PE; Future  -     Cancel: KAMLESH + PE  -     Cancel: Protein Elec + Interp, Serum  -     Cancel: Methylmalonic Acid, Serum  -     Folate  -     Vitamin B12  -     Hemoglobin A1c  Etiology uncertain, having seen initially Dr. Nohemy Wing and more recently Dr. Joana Romero of  neurology.  Check labs as above, some of which were requested by neurology.  Just recently started on gabapentin 300 mg was prescribed 3 times daily by Dr. Romero, though I did advise that he initially take nightly, then gradually as tolerated increase up to 3 times daily, though if he has excessive daytime sedation he may take 2 tablets at bedtime instead of a daily dose  11. Obstructive sleep apnea  Patient follows with Dr. Post for his ARIADNE on CPAP machine.  12. Insomnia due to medical condition  Chronic problem, having been prescribed Lunesta 2 mg nightly which he takes only sporadically, patient advised that he needs to taper off the Lunesta, to spitting he will get better quality sleep also with the addition of gabapentin as noted above.  13. Post-COVID syndrome  Initial diagnosis of COVID-19 in 9/2021 having had fatigue brain fog and some dyspnea.  Did have a CT scan of his chest in 6/2022 which revealed some nonspecific bibasilar groundglass opacities consistent with sequelae of a viral pneumonia, with no specific formal protocol for follow-up.  I discussed pros and cons of pursuing 6-month follow-up study, with patient indicating has not had any dyspnea, having normal oxygenation and clear lungs.  I think it is reasonable to hold off on any further imaging unless he were to develop any recurrent related symptoms.  14.  Other fatigue  -     TSH Rfx On Abnormal To Free T4; Future  -     CBC & Differential; Future  -     CBC & Differential  -     TSH Rfx On Abnormal To Free T4  Nonspecific, occurring post COVID-19 related to long COVID but also having multiple other potential etiologies including his ARIADNE, sequelae from obesity, etc.  Check appropriate screening labs  15. BPH associated with nocturia  -     PSA Screen; Future  -     PSA Screen  Minimally symptomatic at this time.  Advise if becomes more problematic at which point we can prescribe an alpha-blocker  16. Chronic midline low back pain with right-sided sciatica  Intermittent symptoms, not lifestyle limiting.  Treat with Tylenol equivalent  17. Cervicalgia  Improved status post physical therapy.  18. Screening for prostate cancer  -     PSA Screen; Future  -     PSA Screen  Check PSA screen, noting the most recent screen that I can detect was from 12/2020 with a value of 2.26.  19. Need for hepatitis C screening test  -     Hepatitis C Antibody; Future  -     Hepatitis C Antibody    20. Screening for HIV (human immunodeficiency virus)  -     HIV-1 / O / 2 Ag / Antibody 4th Generation; Future  -     HIV-1 / O / 2 Ag / Antibody 4th Generation    21. Need for prophylactic vaccination against Streptococcus pneumoniae (pneumococcus)  -     Pneumococcal Conjugate Vaccine 20-Valent All  Patient indicates she is current with flu vaccine this fall, believes he has had a Shingrix vaccine series outside this office, has had COVID-19 vaccine x2 and is advised to obtain the bivalent booster.       Healthcare Maintenance:  Counseling provided based on age appropriate USPSTF guidelines.  Class 3 Severe Obesity (BMI >=40). Obesity-related health conditions include the following: obstructive sleep apnea, hypertension, coronary heart disease, dyslipidemias and osteoarthritis. Obesity is unchanged. BMI is is above average; BMI management plan is completed. We discussed low calorie, low carb  based diet program, portion control and increasing exercise.    Ethantonya Dysoncell voices understanding and acceptance of this advice and will call back with any further questions or concerns. AVS with preventive healthcare tips printed for patient.     Vaccine Counseling:  “Discussed risks/benefits to vaccination, reviewed components of the vaccine, discussed VIS, discussed informed consent, informed consent obtained. Patient/Parent was allowed to accept or refuse vaccine. Questions answered to satisfactory state of patient/Parent. We reviewed typical age appropriate and seasonally appropriate vaccinations. Reviewed immunization history and updated state vaccination form as needed. Patient was counseled on Prevnar 20      Follow Up:   Return in about 1 year (around 12/23/2023) for Labs today, Annual physical.    At [facility], we believe that sharing information builds trust and better relationships. You are receiving this note because you recently visited [facility]. It is possible you will see health information before a provider has talked with you about it. This kind of information can be easy to misunderstand. To help you fully understand what it means for your health, we urge you to discuss this note with your provider.    Vinay Ramesh MD  [facility]

## 2022-12-23 NOTE — PROGRESS NOTES
Venipuncture Blood Specimen Collection  Venipuncture performed in left arm by Amanda Oviedo MA with good hemostasis. Patient tolerated the procedure well without complications.   12/23/22   Amanda Oviedo MA

## 2022-12-24 LAB
ALBUMIN SERPL-MCNC: 5 G/DL (ref 3.8–4.8)
ALBUMIN/GLOB SERPL: 2 {RATIO} (ref 1.2–2.2)
ALP SERPL-CCNC: 84 IU/L (ref 44–121)
ALT SERPL-CCNC: 27 IU/L (ref 0–44)
APPEARANCE UR: CLEAR
AST SERPL-CCNC: 26 IU/L (ref 0–40)
BACTERIA #/AREA URNS HPF: NORMAL /[HPF]
BASOPHILS # BLD AUTO: 0.1 X10E3/UL (ref 0–0.2)
BASOPHILS NFR BLD AUTO: 1 %
BILIRUB SERPL-MCNC: 0.7 MG/DL (ref 0–1.2)
BILIRUB UR QL STRIP: NEGATIVE
BUN SERPL-MCNC: 18 MG/DL (ref 8–27)
BUN/CREAT SERPL: 13 (ref 10–24)
CALCIUM SERPL-MCNC: 9.7 MG/DL (ref 8.6–10.2)
CASTS URNS QL MICRO: NORMAL /LPF
CHLORIDE SERPL-SCNC: 101 MMOL/L (ref 96–106)
CHOLEST SERPL-MCNC: 163 MG/DL (ref 100–199)
CO2 SERPL-SCNC: 22 MMOL/L (ref 20–29)
COLOR UR: YELLOW
CREAT SERPL-MCNC: 1.34 MG/DL (ref 0.76–1.27)
EGFRCR SERPLBLD CKD-EPI 2021: 59 ML/MIN/1.73
EOSINOPHIL # BLD AUTO: 0.3 X10E3/UL (ref 0–0.4)
EOSINOPHIL NFR BLD AUTO: 3 %
EPI CELLS #/AREA URNS HPF: NORMAL /HPF (ref 0–10)
ERYTHROCYTE [DISTWIDTH] IN BLOOD BY AUTOMATED COUNT: 12.4 % (ref 11.6–15.4)
FOLATE SERPL-MCNC: 16.1 NG/ML
GLOBULIN SER CALC-MCNC: 2.5 G/DL (ref 1.5–4.5)
GLUCOSE SERPL-MCNC: 113 MG/DL (ref 70–99)
GLUCOSE UR QL STRIP: NEGATIVE
HBA1C MFR BLD: 6 % (ref 4.8–5.6)
HCT VFR BLD AUTO: 46.4 % (ref 37.5–51)
HCV AB S/CO SERPL IA: <0.1 S/CO RATIO (ref 0–0.9)
HDLC SERPL-MCNC: 34 MG/DL
HGB BLD-MCNC: 15.7 G/DL (ref 13–17.7)
HGB UR QL STRIP: NEGATIVE
HIV 1+2 AB+HIV1 P24 AG SERPL QL IA: NON REACTIVE
IMM GRANULOCYTES # BLD AUTO: 0.1 X10E3/UL (ref 0–0.1)
IMM GRANULOCYTES NFR BLD AUTO: 1 %
KETONES UR QL STRIP: NEGATIVE
LDLC SERPL CALC-MCNC: 81 MG/DL (ref 0–99)
LEUKOCYTE ESTERASE UR QL STRIP: NEGATIVE
LYMPHOCYTES # BLD AUTO: 2.6 X10E3/UL (ref 0.7–3.1)
LYMPHOCYTES NFR BLD AUTO: 24 %
MCH RBC QN AUTO: 30.8 PG (ref 26.6–33)
MCHC RBC AUTO-ENTMCNC: 33.8 G/DL (ref 31.5–35.7)
MCV RBC AUTO: 91 FL (ref 79–97)
MICRO URNS: NORMAL
MICRO URNS: NORMAL
MONOCYTES # BLD AUTO: 0.9 X10E3/UL (ref 0.1–0.9)
MONOCYTES NFR BLD AUTO: 9 %
NEUTROPHILS # BLD AUTO: 6.8 X10E3/UL (ref 1.4–7)
NEUTROPHILS NFR BLD AUTO: 62 %
NITRITE UR QL STRIP: NEGATIVE
PH UR STRIP: 5 [PH] (ref 5–7.5)
PLATELET # BLD AUTO: 274 X10E3/UL (ref 150–450)
POTASSIUM SERPL-SCNC: 4.3 MMOL/L (ref 3.5–5.2)
PROT SERPL-MCNC: 7.5 G/DL (ref 6–8.5)
PROT UR QL STRIP: NEGATIVE
PSA SERPL-MCNC: 2.5 NG/ML (ref 0–4)
RBC # BLD AUTO: 5.1 X10E6/UL (ref 4.14–5.8)
RBC #/AREA URNS HPF: NORMAL /HPF (ref 0–2)
SODIUM SERPL-SCNC: 141 MMOL/L (ref 134–144)
SP GR UR STRIP: 1.02 (ref 1–1.03)
TRIGL SERPL-MCNC: 289 MG/DL (ref 0–149)
TSH SERPL DL<=0.005 MIU/L-ACNC: 2.83 UIU/ML (ref 0.45–4.5)
URATE SERPL-MCNC: 8.1 MG/DL (ref 3.8–8.4)
URINALYSIS REFLEX: NORMAL
UROBILINOGEN UR STRIP-MCNC: 0.2 MG/DL (ref 0.2–1)
VIT B12 SERPL-MCNC: 350 PG/ML (ref 232–1245)
VLDLC SERPL CALC-MCNC: 48 MG/DL (ref 5–40)
WBC # BLD AUTO: 10.7 X10E3/UL (ref 3.4–10.8)
WBC #/AREA URNS HPF: NORMAL /HPF (ref 0–5)

## 2023-01-01 LAB
ALBUMIN SERPL ELPH-MCNC: 3.9 G/DL (ref 2.9–4.4)
ALBUMIN SERPL ELPH-MCNC: NORMAL G/DL
ALBUMIN/GLOB SERPL: 1.2 {RATIO} (ref 0.7–1.7)
ALPHA1 GLOB SERPL ELPH-MCNC: 0.3 G/DL (ref 0–0.4)
ALPHA1 GLOB SERPL ELPH-MCNC: NORMAL G/DL
ALPHA2 GLOB SERPL ELPH-MCNC: 1 G/DL (ref 0.4–1)
ALPHA2 GLOB SERPL ELPH-MCNC: NORMAL G/DL
B-GLOBULIN SERPL ELPH-MCNC: 1.4 G/DL (ref 0.7–1.3)
B-GLOBULIN SERPL ELPH-MCNC: NORMAL G/DL
GAMMA GLOB SERPL ELPH-MCNC: 0.9 G/DL (ref 0.4–1.8)
GAMMA GLOB SERPL ELPH-MCNC: NORMAL G/DL
GLOBULIN SER-MCNC: 3.5 G/DL (ref 2.2–3.9)
IGA SERPL-MCNC: 343 MG/DL (ref 61–437)
IGG SERPL-MCNC: 997 MG/DL (ref 603–1613)
IGM SERPL-MCNC: 37 MG/DL (ref 20–172)
INTERPRETATION SERPL IEP-IMP: ABNORMAL
LABORATORY COMMENT REPORT: ABNORMAL
LABORATORY COMMENT REPORT: NORMAL
M PROTEIN SERPL ELPH-MCNC: ABNORMAL G/DL
METHYLMALONATE SERPL-SCNC: 305 NMOL/L (ref 0–378)
PROT SERPL-MCNC: 7.4 G/DL (ref 6–8.5)

## 2023-01-03 ENCOUNTER — TELEPHONE (OUTPATIENT)
Dept: FAMILY MEDICINE CLINIC | Facility: CLINIC | Age: 63
End: 2023-01-03
Payer: COMMERCIAL

## 2023-01-03 NOTE — TELEPHONE ENCOUNTER
Phone conversation with patient regarding results of his recently obtained lab testing as follows:    TSH normal  CBC normal  CMP with creatinine 1.34, GFR 59, noting previous creatinine 1.4 and GFR 54 9/2021, glucose 113, otherwise unremarkable  Urinalysis normal  Uric acid 8.1, mildly elevated   B12, folic acid, methylmalonic acid and homocystine all normal  Hemoglobin 6.0% versus previous value of 5.8% in 12/2020  HIV and hepatitis C negative  Total cholesterol 163, triglyceride 289, HDL 34, LDL 81  Serum protein electrophoresis with no monoclonal spikes  PSA 2.5 versus previous value 2.26 in 12/2020    Assessment/medical  1.  Chronic kidney disease stage IIIa.  On lisinopril update testing in 6 months.  2.  Prediabetes, stable.  Emphasize healthy lifestyle and plan to repeat testing in 6 months.  3.  Dyslipidemia with overall picture of, but generally satisfactory.  Continue his atorvastatin 80 mg nightly.  4.  Neuropathy.  Probably related to prediabetes with negative testing otherwise.  5.  Remainder of laboratory testing satisfactory.  6.  Advised to follow-up in 6 months which point we will repeat his renal function and another hemoglobin A1c.

## 2023-03-10 PROBLEM — I25.10 CAD (CORONARY ARTERY DISEASE): Status: ACTIVE | Noted: 2023-03-10

## 2023-03-10 PROBLEM — G47.33 OSA (OBSTRUCTIVE SLEEP APNEA): Status: ACTIVE | Noted: 2023-03-10

## 2023-03-10 PROBLEM — I10 HYPERTENSION: Status: ACTIVE | Noted: 2023-03-10

## 2023-03-10 PROBLEM — E78.5 HYPERLIPIDEMIA: Status: ACTIVE | Noted: 2023-03-10

## 2023-03-10 RX ORDER — ATORVASTATIN CALCIUM 80 MG/1
TABLET, FILM COATED ORAL
COMMUNITY

## 2023-03-10 RX ORDER — LISINOPRIL 40 MG/1
TABLET ORAL
COMMUNITY
End: 2023-03-13 | Stop reason: HOSPADM

## 2023-03-13 ENCOUNTER — OFFICE VISIT (OUTPATIENT)
Dept: CARDIOLOGY | Facility: CLINIC | Age: 63
End: 2023-03-13
Payer: COMMERCIAL

## 2023-03-13 VITALS
BODY MASS INDEX: 39.06 KG/M2 | SYSTOLIC BLOOD PRESSURE: 146 MMHG | HEIGHT: 71 IN | WEIGHT: 279 LBS | OXYGEN SATURATION: 95 % | DIASTOLIC BLOOD PRESSURE: 74 MMHG | HEART RATE: 70 BPM

## 2023-03-13 DIAGNOSIS — G47.33 OSA (OBSTRUCTIVE SLEEP APNEA): ICD-10-CM

## 2023-03-13 DIAGNOSIS — I10 PRIMARY HYPERTENSION: ICD-10-CM

## 2023-03-13 DIAGNOSIS — I25.10 CORONARY ARTERY DISEASE INVOLVING NATIVE HEART WITHOUT ANGINA PECTORIS, UNSPECIFIED VESSEL OR LESION TYPE: ICD-10-CM

## 2023-03-13 DIAGNOSIS — E78.2 MIXED HYPERLIPIDEMIA: ICD-10-CM

## 2023-03-13 PROCEDURE — 99214 OFFICE O/P EST MOD 30 MIN: CPT | Performed by: NURSE PRACTITIONER

## 2023-03-13 RX ORDER — CLOPIDOGREL BISULFATE 75 MG/1
1 TABLET ORAL DAILY
COMMUNITY
Start: 2023-01-15

## 2023-03-13 RX ORDER — METOPROLOL SUCCINATE 50 MG/1
1 TABLET, EXTENDED RELEASE ORAL DAILY
COMMUNITY
Start: 2023-01-14

## 2023-03-13 RX ORDER — ASPIRIN 81 MG/1
81 TABLET, CHEWABLE ORAL DAILY
COMMUNITY

## 2023-03-13 RX ORDER — ESZOPICLONE 2 MG/1
TABLET, FILM COATED ORAL
COMMUNITY
Start: 2023-01-05

## 2023-03-13 RX ORDER — GABAPENTIN 300 MG/1
1 CAPSULE ORAL 3 TIMES DAILY
COMMUNITY
Start: 2023-02-14

## 2023-03-13 RX ORDER — LISINOPRIL AND HYDROCHLOROTHIAZIDE 25; 20 MG/1; MG/1
1 TABLET ORAL DAILY
COMMUNITY
Start: 2022-12-12

## 2023-03-14 PROBLEM — Z95.5 PRESENCE OF CORONARY ANGIOPLASTY IMPLANT AND GRAFT: Status: RESOLVED | Noted: 2022-09-13 | Resolved: 2023-03-14

## 2023-03-14 PROBLEM — I10 HYPERTENSION: Status: RESOLVED | Noted: 2023-03-10 | Resolved: 2023-03-14

## 2023-03-14 PROBLEM — G47.33 OBSTRUCTIVE SLEEP APNEA: Status: RESOLVED | Noted: 2022-09-13 | Resolved: 2023-03-14

## 2023-03-14 PROBLEM — R53.83 OTHER FATIGUE: Status: RESOLVED | Noted: 2022-12-23 | Resolved: 2023-03-14

## 2023-03-14 PROBLEM — I50.32 CHRONIC DIASTOLIC (CONGESTIVE) HEART FAILURE: Status: RESOLVED | Noted: 2022-09-13 | Resolved: 2023-03-14

## 2023-03-14 PROBLEM — J45.21 MILD INTERMITTENT ASTHMA WITH (ACUTE) EXACERBATION: Status: RESOLVED | Noted: 2022-09-13 | Resolved: 2023-03-14

## 2023-03-14 PROBLEM — M10.9 GOUT, UNSPECIFIED: Status: RESOLVED | Noted: 2022-09-13 | Resolved: 2023-03-14

## 2023-03-14 PROBLEM — E78.5 HYPERLIPIDEMIA: Status: RESOLVED | Noted: 2023-03-10 | Resolved: 2023-03-14

## 2023-03-14 PROBLEM — U09.9 POST-COVID SYNDROME: Status: RESOLVED | Noted: 2022-12-23 | Resolved: 2023-03-14

## 2023-03-14 PROBLEM — I25.10 ATHSCL HEART DISEASE OF NATIVE CORONARY ARTERY W/O ANG PCTRS: Status: RESOLVED | Noted: 2022-09-13 | Resolved: 2023-03-14

## 2023-03-14 PROBLEM — M54.16 RADICULOPATHY, LUMBAR REGION: Status: RESOLVED | Noted: 2022-09-13 | Resolved: 2023-03-14

## 2023-03-14 NOTE — ASSESSMENT & PLAN NOTE
- History of ARIADNE with AHI 15 on October 29, 2016.  With the titration on May 24, 2019.    - On BiPAP therapy.  Patient requesting pressures be lowered again.

## 2023-03-14 NOTE — PROGRESS NOTES
[facility] Provider Note     Date:   2023   Name: Ethan Moses  :   1960  PCP: Vinay Ramesh MD    Chief Complaint   Patient presents with   • Sleep Apnea   • Hypertension       Subjective     History of Present Illness  Ethan Moses is a 62 y.o. male who presents today for follow-up on ARIADNE, hypertension, hyperlipidemia, and coronary artery disease.  He denies any chest pain that is not relieved by nitro and has not gotten worse.  He also denies shortness of air, edema, palpitations, dizziness, or syncope.    History of ARIADNE with baseline AHI 15 on 2016.  Patient is benefiting from PAP therapy.  However he feels like his pressures are too high.  We last changed his pressures December 10, 2022.  His [employer]Station auto BiPAP compliance download dated February 10, 2023 to 2023 interpreted in clinic today.  Shown patient uses his machine 100% of the time and uses over 4 hours 100% of the time for an average use of 11 hours and 39 minutes.  His current auto BiPAP is set to a maximum titrated IPAP of 20, maximum titrated EPAP 16, max pressure support of 6, minimum pressure support of 4, Volplex setting of 3 and an average AHI of 1.0 showing excellent blood compliance and excellent control.    The patient feels like he is getting too much air we will make a slight pressure adjustment and see him back for follow-up in a few months.      2016 baseline AHI of 15    Heart cath at [facility] status post RCA stent 2019, cath 2021 with subtotal PL branch.    2022 bilateral carotid artery duplex less than 50%.    2021 echo sinus rhythm, normal EF, mild concentric left ventricular hypertrophy, left atrium is mildly dilated, aortic valve is trileaflet and mildly thickened, abnormal diastolic function.    2022 EKG sinus rhythm    Allergies   Allergen Reactions   • Contrast Dye (Echo Or Unknown Ct/Mr)  Hives   • Contrast Dye (Echo Or Unknown Ct/Mr) Hives       Current Outpatient Medications:   •  aspirin 81 MG chewable tablet, Chew 1 tablet Daily., Disp: , Rfl:   •  atorvastatin (LIPITOR) 80 MG tablet, , Disp: , Rfl:   •  clopidogrel (PLAVIX) 75 MG tablet, Take 1 tablet by mouth Daily., Disp: , Rfl:   •  eszopiclone (LUNESTA) 2 MG tablet, TAKE 1 TABLET IMMEDIATELY BEFORE BEDTIME ONCE A DAY FOR 30 DAYS, Disp: , Rfl:   •  gabapentin (NEURONTIN) 300 MG capsule, Take 1 capsule by mouth 3 (Three) Times a Day., Disp: , Rfl:   •  lisinopril-hydrochlorothiazide (PRINZIDE,ZESTORETIC) 20-25 MG per tablet, Take 1 tablet by mouth Daily., Disp: , Rfl:   •  metoprolol succinate XL (TOPROL-XL) 50 MG 24 hr tablet, Take 1 tablet by mouth Daily., Disp: , Rfl:   •  aspirin 81 MG EC tablet, Low Dose Aspirin, Disp: , Rfl:   •  atorvastatin (LIPITOR) 80 MG tablet, atorvastatin 80 mg tablet  Take 1 tablet every day by oral route., Disp: , Rfl:   •  clopidogrel (PLAVIX) 75 MG tablet, Take 75 mg by mouth Daily., Disp: , Rfl:   •  eszopiclone (LUNESTA) 2 MG tablet, TAKE 1 TABLET IMMEDIATELY BEFORE BEDTIME ONCE A DAY FOR 30 DAYS, Disp: , Rfl:   •  gabapentin (NEURONTIN) 300 MG capsule, Take 300 mg by mouth 3 (Three) Times a Day., Disp: , Rfl:   •  lisinopril-hydrochlorothiazide (PRINZIDE,ZESTORETIC) 20-25 MG per tablet, Take 1 tablet by mouth Daily., Disp: , Rfl:   •  metoprolol succinate XL (TOPROL-XL) 50 MG 24 hr tablet, metoprolol succinate ER 50 mg tablet,extended release 24 hr  Take 1 tablet every day by oral route., Disp: , Rfl:   •  nitroglycerin (NITROSTAT) 0.4 MG SL tablet, Place 0.4 mg under the tongue Every 5 (Five) Minutes As Needed. Take no more than 3 doses in 15 minutes., Disp: , Rfl:     Past Medical History:   Diagnosis Date   • Acute bronchiolitis, unspecified    • Acute nasopharyngitis    • Acute respiratory failure with hypoxia (HCC)    • Athscl heart disease of native coronary artery w/o ang pctrs    • CAD (coronary  artery disease)     status post left heart catheterization 1/24/2019 with Dr. Connolly status post stent placement to the PDA, with a second 90% lesion of a posterior lateral circumflex branch that was not amenable to stent placement   • CAD (coronary artery disease)    • Chronic diastolic (congestive) heart failure (HCC)    • COVID-19    • Diarrhea, unspecified    • Dyspnea, unspecified    • Gout, unspecified    • HLD (hyperlipidemia)    • HTN (hypertension)    • Hyperlipidemia    • Hypertension    • Hypoxemia    • Insomnia due to medical condition    • Lipoma of back    • Low back pain    • Mild intermittent asthma with (acute) exacerbation    • Nausea    • Non morbid obesity    • Obstructive sleep apnea    • ARIADNE (obstructive sleep apnea)    • Other disorders of lung    • Other sleep disorders    • Other symptoms and signs involving cognitive functions and awareness    • Pain in left foot    • Post covid-19 condition, unspecified    • Presence of coronary angioplasty implant and graft    • Radiculopathy, lumbar region    • Right Achilles tendinitis    • Sciatica       Past Surgical History:   Procedure Laterality Date   • CARDIAC CATHETERIZATION  01/24/2019    with Dr. Connolly status post stent placement to the PDA, with a second 90% lesion of a posterior lateral circumflex branch that was not amenable to stent placement   • LIPOMA EXCISION      FROM BACK     Family History   Problem Relation Age of Onset   • Prostate cancer Father    • Hypertension Father    • Breast cancer Sister      Social History     Socioeconomic History   • Marital status:    Tobacco Use   • Smokeless tobacco: Never   Vaping Use   • Vaping Use: Never used   Substance and Sexual Activity   • Alcohol use: Yes     Comment: In the 1980s, and early 1990s, he did use excessive alcohol.  Currently now a weekend drinker, typically 7-8 ounces of bourbon over the weekend   • Drug use: Not Currently   • Sexual activity: Yes     Partners: Female  "      Objective     Vital Signs:  /74   Pulse 70   Ht 180.3 cm (71\")   Wt 127 kg (279 lb)   SpO2 95%   BMI 38.91 kg/m²   Estimated body mass index is 38.91 kg/m² as calculated from the following:    Height as of this encounter: 180.3 cm (71\").    Weight as of this encounter: 127 kg (279 lb).             Physical Exam  Vitals reviewed.   Constitutional:       Appearance: Normal appearance.   Eyes:      Pupils: Pupils are equal, round, and reactive to light.   Neck:      Vascular: No carotid bruit.   Cardiovascular:      Rate and Rhythm: Normal rate and regular rhythm.      Pulses: Normal pulses.      Heart sounds: Normal heart sounds.   Pulmonary:      Effort: Pulmonary effort is normal.      Breath sounds: Normal breath sounds.   Musculoskeletal:         General: Normal range of motion.      Right lower leg: No edema.      Left lower leg: No edema.   Skin:     General: Skin is warm and dry.   Neurological:      Mental Status: He is alert and oriented to person, place, and time.      Gait: Gait is intact.   Psychiatric:         Attention and Perception: Attention normal.         Mood and Affect: Mood normal.                     Assessment and Plan     Diagnoses and all orders for this visit:    1. ARIADNE (obstructive sleep apnea)  Assessment & Plan:  - History of ARIADNE with AHI 15 on October 29, 2016.  With the titration on May 24, 2019.    - On BiPAP therapy.  Patient requesting pressures be lowered again.    Orders:  -     PAP Therapy    2. Primary hypertension  Assessment & Plan:  - Blood pressure slightly elevated today.  We will recheck next visit.      3. Coronary artery disease involving native heart without angina pectoris, unspecified vessel or lesion type  Assessment & Plan:  - Heart cath at [facility] status post RCA stent January 24, 2019, cath December 2021 with subtotal PL branch.    - On Plavix, aspirin, beta-blocker    - Chest pain controlled with nitro      4. Mixed hyperlipidemia  Assessment & " Plan:  - On statin        Recommendations: ER if symptoms increase, Limitations of stress testing for definitive diagnosis reviewed, Sleep hygiene discussed, Sleep risks reviewed (driving, medical, sleep death, sedating agents), Limit salt, Exercise recommendations discussed and Report if any new/changing symptoms immediately           Follow Up  Return in about 3 months (around 6/13/2023) for ARIADNE/CAD.  Patient was given instructions and counseling regarding his condition or for health maintenance advice. Please see specific information pulled into the AVS if appropriate.

## 2023-03-14 NOTE — ASSESSMENT & PLAN NOTE
- Heart cath at [facility] status post RCA stent January 24, 2019, cath December 2021 with subtotal PL branch.    - On Plavix, aspirin, beta-blocker    - Chest pain controlled with nitro

## 2023-07-28 ENCOUNTER — TELEPHONE (OUTPATIENT)
Dept: CARDIOLOGY | Facility: CLINIC | Age: 63
End: 2023-07-28
Payer: COMMERCIAL

## 2023-08-11 ENCOUNTER — OFFICE VISIT (OUTPATIENT)
Dept: CARDIOLOGY | Facility: CLINIC | Age: 63
End: 2023-08-11
Payer: COMMERCIAL

## 2023-08-11 VITALS
WEIGHT: 281 LBS | HEART RATE: 82 BPM | SYSTOLIC BLOOD PRESSURE: 130 MMHG | BODY MASS INDEX: 40.23 KG/M2 | DIASTOLIC BLOOD PRESSURE: 70 MMHG | OXYGEN SATURATION: 92 % | HEIGHT: 70 IN

## 2023-08-11 DIAGNOSIS — G47.33 OSA (OBSTRUCTIVE SLEEP APNEA): ICD-10-CM

## 2023-08-11 DIAGNOSIS — I25.10 CORONARY ARTERY DISEASE INVOLVING NATIVE HEART WITHOUT ANGINA PECTORIS, UNSPECIFIED VESSEL OR LESION TYPE: ICD-10-CM

## 2023-08-11 DIAGNOSIS — G47.01 INSOMNIA DUE TO MEDICAL CONDITION: ICD-10-CM

## 2023-08-11 PROCEDURE — 99214 OFFICE O/P EST MOD 30 MIN: CPT | Performed by: NURSE PRACTITIONER

## 2023-08-11 PROCEDURE — 93000 ELECTROCARDIOGRAM COMPLETE: CPT | Performed by: NURSE PRACTITIONER

## 2023-08-11 RX ORDER — TRAZODONE HYDROCHLORIDE 50 MG/1
50 TABLET ORAL NIGHTLY
Qty: 90 TABLET | Refills: 0 | Status: SHIPPED | OUTPATIENT
Start: 2023-08-11

## 2023-08-11 NOTE — PROGRESS NOTES
[facility] Provider Note     Date:   2023   Name: Ethan Moses  :   1960  PCP: Vinay Ramesh MD    Chief Complaint   Patient presents with    Follow-up    Sleep Apnea    Coronary Artery Disease       Subjective     History of Present Illness  Ethan Moses is a 63 y.o. male who presents today for ARIADNE and CAD      ARIADNE  He feels like the slight pressure adjustment we made helped but that he still has difficulty keeping his mask on because he moves around too much.  He has insomnia and will lie awake for 2 or 3 hours at a time.  I have asked him to really tighten up his mask straps and we can do a trial of trazodone 50 mg, 1-2 p.o. nightly.    CPAP compliance report dated 2023 to 2023 download interpreted in clinic today.  Shown patient uses his machine 100% of the time, over 4 hours 97% of the time, for an average use of 10 hours and 53 minutes.  His overall AHI is 1.0 showing excellent compliance and excellent control.  His auto bilevel and has a maximum titrated IPAP of 20 with a maximum titrated EPAP of 14 and an average leak of 51 minutes a night.  He is benefiting from PAP therapy and being compliant.  We will plan to continue.    CAD S/P Stenting  Cath 2021. Echo 2021.  EKG today NSR.  2-3 years ago same thing. All was fine.  5 family vacation.  Night sweats, nausea, chiro helped with breathing.  3 grandkids home with him but they are leaving tomorrow.  He thinks its just anxiety but agrees to let me know if symptoms do not resolve within a few days giving him time to get settled in from their 5 family vacation and get the grand kids sent back home. At that point we would need to consider repeating some cardiac testing.  On BB, Plavix, and ASA.    Cardiology and sleep related problem list    ARIADNE  CAD -S/P stenting  Insomnia  HTN  HLP    2016 baseline AHI of 15    Heart cath at [facility] status post RCA stent 2019, cath  December 2021 with subtotal PL branch.    July 7, 2022 bilateral carotid artery duplex less than 50%.    October 21, 2021 echo sinus rhythm, normal EF, mild concentric left ventricular hypertrophy, left atrium is mildly dilated, aortic valve is trileaflet and mildly thickened, abnormal diastolic function.    December 12, 2022 EKG sinus rhythm    Allergies   Allergen Reactions    Contrast Dye (Echo Or Unknown Ct/Mr) Hives    Contrast Dye (Echo Or Unknown Ct/Mr) Hives       Current Outpatient Medications:     aspirin 81 MG chewable tablet, Chew 1 tablet Daily., Disp: , Rfl:     atorvastatin (LIPITOR) 80 MG tablet, , Disp: , Rfl:     clopidogrel (PLAVIX) 75 MG tablet, Take 1 tablet by mouth Daily., Disp: , Rfl:     gabapentin (NEURONTIN) 300 MG capsule, Take 1 capsule by mouth 3 (Three) Times a Day., Disp: , Rfl:     lisinopril-hydrochlorothiazide (PRINZIDE,ZESTORETIC) 20-25 MG per tablet, Take 1 tablet by mouth Daily., Disp: , Rfl:     metoprolol succinate XL (TOPROL-XL) 50 MG 24 hr tablet, Take 1 tablet by mouth Daily., Disp: , Rfl:     nitroglycerin (NITROSTAT) 0.4 MG SL tablet, Place 1 tablet under the tongue Every 5 (Five) Minutes As Needed. Take no more than 3 doses in 15 minutes., Disp: , Rfl:     traZODone (DESYREL) 50 MG tablet, Take 1 tablet by mouth Every Night., Disp: 90 tablet, Rfl: 0    Past Medical History:   Diagnosis Date    Acute bronchiolitis, unspecified     Acute nasopharyngitis     Acute respiratory failure with hypoxia     Athscl heart disease of native coronary artery w/o ang pctrs     CAD (coronary artery disease)     status post left heart catheterization 1/24/2019 with Dr. Connolly status post stent placement to the PDA, with a second 90% lesion of a posterior lateral circumflex branch that was not amenable to stent placement    CAD (coronary artery disease)     Chronic diastolic (congestive) heart failure     COVID-19     Diarrhea, unspecified     Dyspnea, unspecified     Gout, unspecified      "HLD (hyperlipidemia)     HTN (hypertension)     Hyperlipidemia     Hypertension     Hypoxemia     Insomnia due to medical condition     Lipoma of back     Low back pain     Mild intermittent asthma with (acute) exacerbation     Nausea     Non morbid obesity     Obstructive sleep apnea     ARIADNE (obstructive sleep apnea)     Other disorders of lung     Other sleep disorders     Other symptoms and signs involving cognitive functions and awareness     Pain in left foot     Post covid-19 condition, unspecified     Presence of coronary angioplasty implant and graft     Radiculopathy, lumbar region     Right Achilles tendinitis     Sciatica       Past Surgical History:   Procedure Laterality Date    CARDIAC CATHETERIZATION  01/24/2019    with Dr. Connolly status post stent placement to the PDA, with a second 90% lesion of a posterior lateral circumflex branch that was not amenable to stent placement    LIPOMA EXCISION      FROM BACK     Family History   Problem Relation Age of Onset    Prostate cancer Father     Hypertension Father     Breast cancer Sister      Social History     Socioeconomic History    Marital status:    Tobacco Use    Smoking status: Never     Passive exposure: Never    Smokeless tobacco: Never   Vaping Use    Vaping Use: Never used   Substance and Sexual Activity    Alcohol use: Yes     Comment: In the 1980s, and early 1990s, he did use excessive alcohol.  Currently now a weekend drinker, typically 7-8 ounces of bourbon over the weekend    Drug use: Not Currently    Sexual activity: Yes     Partners: Female       Objective     Vital Signs:  /70 (BP Location: Left arm)   Pulse 82   Ht 177.8 cm (70\")   Wt 127 kg (281 lb)   SpO2 92%   BMI 40.32 kg/mý   Estimated body mass index is 40.32 kg/mý as calculated from the following:    Height as of this encounter: 177.8 cm (70\").    Weight as of this encounter: 127 kg (281 lb).             Physical Exam  Vitals reviewed.   Constitutional:       " Appearance: Normal appearance.   Eyes:      Pupils: Pupils are equal, round, and reactive to light.   Neck:      Vascular: No carotid bruit.   Cardiovascular:      Rate and Rhythm: Normal rate and regular rhythm.      Pulses: Normal pulses.      Heart sounds: Normal heart sounds.   Pulmonary:      Effort: Pulmonary effort is normal.      Breath sounds: Normal breath sounds.   Musculoskeletal:         General: Normal range of motion.      Right lower leg: No edema.      Left lower leg: No edema.   Skin:     General: Skin is warm and dry.   Neurological:      Mental Status: He is alert and oriented to person, place, and time.      Gait: Gait is intact.   Psychiatric:         Attention and Perception: Attention normal.         Mood and Affect: Mood normal.         Behavior: Behavior normal.               ECG 12 Lead    Date/Time: 8/11/2023 11:02 PM  Performed by: Love Richter APRN  Authorized by: Love Richter APRN   Comparison: compared with previous ECG from 12/12/2022  Similar to previous ECG  Comparison to previous ECG: NSR  Rhythm: sinus rhythm    Clinical impression: normal ECG         Assessment and Plan     Diagnoses and all orders for this visit:    1. Insomnia due to medical condition  Comments:  RX trial of Trazodone  Orders:  -     traZODone (DESYREL) 50 MG tablet; Take 1 tablet by mouth Every Night.  Dispense: 90 tablet; Refill: 0    2. ARIADNE (obstructive sleep apnea)  Comments:  Benefiting from PAP therapy.  Plan to continue.  Treating insomnia may improve patient's sleep and how long he can wear that machine.    3. Coronary artery disease involving native heart without angina pectoris, unspecified vessel or lesion type  Comments:  Having night sweats, nausea.  Patient feels its anxiety.  EKG normal sinus rhythm.  No chest pain.  Watch closely.    Other orders  -     ECG 12 Lead        Recommendations: ER if symptoms increase, Report if any new/changing symptoms immediately, Sleep  risks reviewed (driving, medical, sleep death, sedating agents), Sleep hygiene discussed, and Increase pap therapy usage          Follow Up  Return in about 3 months (around 11/11/2023).  Patient was given instructions and counseling regarding his condition or for health maintenance advice. Please see specific information pulled into the AVS if appropriate.

## 2023-08-11 NOTE — PATIENT INSTRUCTIONS
Call/mychart message in about a week or so to let me know  1.) How you are feeling once kids gone/back to normal life  2.) How Trazodone is working

## 2023-10-08 DIAGNOSIS — G47.01 INSOMNIA DUE TO MEDICAL CONDITION: ICD-10-CM

## 2023-10-09 RX ORDER — TRAZODONE HYDROCHLORIDE 50 MG/1
50 TABLET ORAL
Qty: 90 TABLET | Refills: 0 | Status: SHIPPED | OUTPATIENT
Start: 2023-10-09

## 2023-11-07 ENCOUNTER — TELEPHONE (OUTPATIENT)
Dept: CARDIOLOGY | Facility: CLINIC | Age: 63
End: 2023-11-07

## 2023-11-07 ENCOUNTER — OFFICE VISIT (OUTPATIENT)
Dept: CARDIOLOGY | Facility: CLINIC | Age: 63
End: 2023-11-07
Payer: COMMERCIAL

## 2023-11-07 VITALS
WEIGHT: 283.8 LBS | SYSTOLIC BLOOD PRESSURE: 142 MMHG | HEIGHT: 70 IN | HEART RATE: 89 BPM | DIASTOLIC BLOOD PRESSURE: 88 MMHG | OXYGEN SATURATION: 97 % | BODY MASS INDEX: 40.63 KG/M2

## 2023-11-07 DIAGNOSIS — I10 PRIMARY HYPERTENSION: ICD-10-CM

## 2023-11-07 DIAGNOSIS — I25.10 CORONARY ARTERY DISEASE INVOLVING NATIVE HEART WITHOUT ANGINA PECTORIS, UNSPECIFIED VESSEL OR LESION TYPE: ICD-10-CM

## 2023-11-07 DIAGNOSIS — Z91.89 CHRONIC CHEST PAIN WITH HIGH RISK FOR CAD: ICD-10-CM

## 2023-11-07 DIAGNOSIS — G89.29 CHRONIC CHEST PAIN WITH HIGH RISK FOR CAD: ICD-10-CM

## 2023-11-07 DIAGNOSIS — E78.2 MIXED HYPERLIPIDEMIA: ICD-10-CM

## 2023-11-07 DIAGNOSIS — G47.33 OSA (OBSTRUCTIVE SLEEP APNEA): ICD-10-CM

## 2023-11-07 DIAGNOSIS — R07.9 CHRONIC CHEST PAIN WITH HIGH RISK FOR CAD: ICD-10-CM

## 2023-11-07 PROCEDURE — 93000 ELECTROCARDIOGRAM COMPLETE: CPT | Performed by: NURSE PRACTITIONER

## 2023-11-07 PROCEDURE — 99214 OFFICE O/P EST MOD 30 MIN: CPT | Performed by: NURSE PRACTITIONER

## 2023-11-07 RX ORDER — DOXAZOSIN MESYLATE 1 MG/1
1 TABLET ORAL NIGHTLY
Qty: 90 TABLET | Refills: 0 | Status: SHIPPED | OUTPATIENT
Start: 2023-11-07

## 2023-11-07 NOTE — PROGRESS NOTES
[facility] Provider Note     Date:  2023   Name: Ethan Moses  :   1960  PCP: Vinay Ramesh MD    Chief Complaint   Patient presents with   • Follow-up     ARIADNE-DL in chart    • Sleep Apnea       Subjective     History of Present Illness  Ethan Moses is a 63 y.o. male who presents today for ARIADNE and CAD follow-up. Blood pressure continues to be elevated.  Patient doesn't want to increase metoprolol due already feeling fatigued.  He is willing to add Doxazosin as a trial.    He continues to have chest discomfort that has worsened since I saw him last.  He said he has had chest discomfort five times since August.  He is becoming concerned and is willing to update stress test.  On BB, Plavix, and ASA.    PAP machine, mask, and airflow are comfortable and working well.  His insomnia is better with the Trazodone.  He christi been taking it a few days a week.    Cardiology and sleep related problem list    ARIADNE  CAD -S/P stenting  Insomnia  HTN  HLP    2016 baseline AHI of 15    Heart cath at [facility] status post RCA stent 2019, cath 2021 with subtotal PL branch.    2022 bilateral carotid artery duplex less than 50%.    2021 echo sinus rhythm, normal EF, mild concentric left ventricular hypertrophy, left atrium is mildly dilated, aortic valve is trileaflet and mildly thickened, abnormal diastolic function.    2022 Chol 163, tri 289, HDL 34, LDL 81    2022 EKG sinus rhythm    Allergies   Allergen Reactions   • Contrast Dye (Echo Or Unknown Ct/Mr) Hives   • Contrast Dye (Echo Or Unknown Ct/Mr) Hives       Current Outpatient Medications:   •  aspirin 81 MG chewable tablet, Chew 1 tablet Daily., Disp: , Rfl:   •  atorvastatin (LIPITOR) 80 MG tablet, , Disp: , Rfl:   •  clopidogrel (PLAVIX) 75 MG tablet, Take 1 tablet by mouth Daily., Disp: , Rfl:   •  gabapentin (NEURONTIN) 300 MG capsule, Take 1 capsule by mouth  3 (Three) Times a Day., Disp: , Rfl:   •  lisinopril-hydrochlorothiazide (PRINZIDE,ZESTORETIC) 20-25 MG per tablet, Take 1 tablet by mouth Daily., Disp: , Rfl:   •  metoprolol succinate XL (TOPROL-XL) 50 MG 24 hr tablet, Take 1 tablet by mouth Daily., Disp: , Rfl:   •  nitroglycerin (NITROSTAT) 0.4 MG SL tablet, Place 1 tablet under the tongue Every 5 (Five) Minutes As Needed. Take no more than 3 doses in 15 minutes., Disp: , Rfl:   •  traZODone (DESYREL) 50 MG tablet, TAKE 1 TABLET BY MOUTH EVERY DAY AT NIGHT (Patient taking differently: Take 1 tablet by mouth every night at bedtime. Not taking every night due to constipation), Disp: 90 tablet, Rfl: 0  •  doxazosin (Cardura) 1 MG tablet, Take 1 tablet by mouth Every Night., Disp: 90 tablet, Rfl: 0    Past Medical History:   Diagnosis Date   • Acute bronchiolitis, unspecified    • Acute nasopharyngitis    • Acute respiratory failure with hypoxia    • Athscl heart disease of native coronary artery w/o ang pctrs    • CAD (coronary artery disease)     status post left heart catheterization 1/24/2019 with Dr. Connolly status post stent placement to the PDA, with a second 90% lesion of a posterior lateral circumflex branch that was not amenable to stent placement   • CAD (coronary artery disease)    • Chronic diastolic (congestive) heart failure    • COVID-19    • Diarrhea, unspecified    • Dyspnea, unspecified    • Gout, unspecified    • HLD (hyperlipidemia)    • HTN (hypertension)    • Hyperlipidemia    • Hypertension    • Hypoxemia    • Insomnia due to medical condition    • Lipoma of back    • Low back pain    • Mild intermittent asthma with (acute) exacerbation    • Nausea    • Non morbid obesity    • Obstructive sleep apnea    • ARIADNE (obstructive sleep apnea)    • Other disorders of lung    • Other sleep disorders    • Other symptoms and signs involving cognitive functions and awareness    • Pain in left foot    • Post covid-19 condition, unspecified    • Presence of  "coronary angioplasty implant and graft    • Radiculopathy, lumbar region    • Right Achilles tendinitis    • Sciatica       Past Surgical History:   Procedure Laterality Date   • CARDIAC CATHETERIZATION  01/24/2019    with Dr. Connolly status post stent placement to the PDA, with a second 90% lesion of a posterior lateral circumflex branch that was not amenable to stent placement   • LIPOMA EXCISION      FROM BACK     Family History   Problem Relation Age of Onset   • Prostate cancer Father    • Hypertension Father    • Breast cancer Sister      Social History     Socioeconomic History   • Marital status:    Tobacco Use   • Smoking status: Never     Passive exposure: Never   • Smokeless tobacco: Never   Vaping Use   • Vaping Use: Never used   Substance and Sexual Activity   • Alcohol use: Yes     Comment: In the 1980s, and early 1990s, he did use excessive alcohol.  Currently now a weekend drinker, typically 7-8 ounces of bourbon over the weekend   • Drug use: Not Currently   • Sexual activity: Yes     Partners: Female       Objective     Vital Signs:  /88   Pulse 89   Ht 177.8 cm (70\")   Wt 129 kg (283 lb 12.8 oz)   SpO2 97%   BMI 40.72 kg/m²   Estimated body mass index is 40.72 kg/m² as calculated from the following:    Height as of this encounter: 177.8 cm (70\").    Weight as of this encounter: 129 kg (283 lb 12.8 oz).             Physical Exam  Vitals reviewed.   Constitutional:       Appearance: Normal appearance.   Eyes:      Pupils: Pupils are equal, round, and reactive to light.   Neck:      Vascular: No carotid bruit.   Cardiovascular:      Rate and Rhythm: Normal rate and regular rhythm.      Pulses: Normal pulses.      Heart sounds: Normal heart sounds.   Pulmonary:      Effort: Pulmonary effort is normal.      Breath sounds: Normal breath sounds.   Musculoskeletal:         General: Normal range of motion.      Right lower leg: No edema.      Left lower leg: No edema.   Skin:     General: " Skin is warm and dry.   Neurological:      Mental Status: He is alert and oriented to person, place, and time.      Gait: Gait is intact.   Psychiatric:         Attention and Perception: Attention normal.         Mood and Affect: Mood normal.         Behavior: Behavior normal.                 ECG 12 Lead    Date/Time: 11/7/2023 11:36 AM  Performed by: Love Richter APRN    Authorized by: Love Richter APRN  Comparison: compared with previous ECG from 8/14/2023  Similar to previous ECG  Rhythm: sinus rhythm    Clinical impression: normal ECG           Assessment and Plan     Diagnoses and all orders for this visit:    1. Coronary artery disease involving native heart without angina pectoris, unspecified vessel or lesion type  Comments:  symptomtaic, update stress test. on medical therapy.  Orders:  -     Stress Test With Myocardial Perfusion One Day; Future    2. Chronic chest pain with high risk for CAD  Comments:  Worsening. Update stress test.  Orders:  -     Stress Test With Myocardial Perfusion One Day; Future    3. Primary hypertension  Comments:  Not at goal, doesn't want to increase metoprolol. Add Doxazosin.    4. Mixed hyperlipidemia  Comments:  LDL not at goal. Recheck fasting lipids at follow-up.  On statin.    5. ARIADNE (obstructive sleep apnea)  Comments:  Benefitting from pap therapy. Plan to continue.    Other orders  -     doxazosin (Cardura) 1 MG tablet; Take 1 tablet by mouth Every Night.  Dispense: 90 tablet; Refill: 0  -     ECG 12 Lead        Recommendations: ER if symptoms increase, Report if any new/changing symptoms immediately, Sleep risks reviewed (driving, medical, sleep death, sedating agents), Sleep hygiene discussed, and Increase pap therapy usage          Follow Up  Return for After stress test.  Patient was given instructions and counseling regarding his condition or for health maintenance advice. Please see specific information pulled into the AVS if  appropriate.

## 2023-11-07 NOTE — TELEPHONE ENCOUNTER
Hub staff attempted to follow warm transfer process and was unsuccessful     Caller: Ethan Moses    Relationship to patient: Self    Best call back number: [phone]     Patient is needing: PATIENT CALLED IN TO SPEAK TO ANGELO ABOUT RESCHEDULING HIS STRESS TEST. HE NEEDS A DIFFERENT DAY DUE TO WORK.

## 2023-11-21 ENCOUNTER — OUTSIDE FACILITY SERVICE (OUTPATIENT)
Dept: CARDIOLOGY | Facility: CLINIC | Age: 63
End: 2023-11-21
Payer: COMMERCIAL

## 2023-11-29 ENCOUNTER — HOSPITAL ENCOUNTER (OUTPATIENT)
Dept: CARDIOLOGY | Facility: HOSPITAL | Age: 63
Discharge: HOME | End: 2023-11-29

## 2023-11-29 ENCOUNTER — OFFICE VISIT (OUTPATIENT)
Dept: CARDIOLOGY | Facility: CLINIC | Age: 63
End: 2023-11-29
Payer: COMMERCIAL

## 2023-11-29 VITALS
OXYGEN SATURATION: 99 % | DIASTOLIC BLOOD PRESSURE: 80 MMHG | WEIGHT: 283.5 LBS | BODY MASS INDEX: 40.59 KG/M2 | HEIGHT: 70 IN | SYSTOLIC BLOOD PRESSURE: 138 MMHG | HEART RATE: 81 BPM

## 2023-11-29 DIAGNOSIS — G89.29 CHRONIC CHEST PAIN WITH HIGH RISK FOR CAD: ICD-10-CM

## 2023-11-29 DIAGNOSIS — R07.9 CHRONIC CHEST PAIN WITH HIGH RISK FOR CAD: ICD-10-CM

## 2023-11-29 DIAGNOSIS — G47.33 OSA (OBSTRUCTIVE SLEEP APNEA): ICD-10-CM

## 2023-11-29 DIAGNOSIS — G47.01 INSOMNIA DUE TO MEDICAL CONDITION: ICD-10-CM

## 2023-11-29 DIAGNOSIS — I25.10 CORONARY ARTERY DISEASE INVOLVING NATIVE HEART WITHOUT ANGINA PECTORIS, UNSPECIFIED VESSEL OR LESION TYPE: Primary | ICD-10-CM

## 2023-11-29 DIAGNOSIS — F41.1 GAD (GENERALIZED ANXIETY DISORDER): ICD-10-CM

## 2023-11-29 DIAGNOSIS — Z91.89 CHRONIC CHEST PAIN WITH HIGH RISK FOR CAD: ICD-10-CM

## 2023-11-29 DIAGNOSIS — I25.10 CORONARY ARTERY DISEASE INVOLVING NATIVE HEART WITHOUT ANGINA PECTORIS, UNSPECIFIED VESSEL OR LESION TYPE: ICD-10-CM

## 2023-11-29 RX ORDER — LISINOPRIL AND HYDROCHLOROTHIAZIDE 25; 20 MG/1; MG/1
1 TABLET ORAL DAILY
Qty: 90 TABLET | Refills: 1 | Status: SHIPPED | OUTPATIENT
Start: 2023-11-29

## 2023-11-29 RX ORDER — CLOPIDOGREL BISULFATE 75 MG/1
75 TABLET ORAL DAILY
Qty: 90 TABLET | Refills: 1 | Status: SHIPPED | OUTPATIENT
Start: 2023-11-29

## 2023-11-29 RX ORDER — ATORVASTATIN CALCIUM 80 MG/1
80 TABLET, FILM COATED ORAL NIGHTLY
Qty: 90 TABLET | Refills: 1 | Status: SHIPPED | OUTPATIENT
Start: 2023-11-29

## 2023-11-29 RX ORDER — METOPROLOL SUCCINATE 50 MG/1
50 TABLET, EXTENDED RELEASE ORAL DAILY
Qty: 90 TABLET | Refills: 1 | Status: SHIPPED | OUTPATIENT
Start: 2023-11-29

## 2023-11-29 RX ORDER — TRAZODONE HYDROCHLORIDE 50 MG/1
50 TABLET ORAL
Qty: 90 TABLET | Refills: 1 | Status: SHIPPED | OUTPATIENT
Start: 2023-11-29

## 2023-11-29 RX ORDER — ESCITALOPRAM OXALATE 10 MG/1
10 TABLET ORAL DAILY
Qty: 30 TABLET | Refills: 1 | Status: SHIPPED | OUTPATIENT
Start: 2023-11-29

## 2023-11-29 NOTE — PROGRESS NOTES
[facility] Provider Note     Date:  2023   Name: Ethan Moses  :   1960  PCP: Vinay Ramesh MD    Chief Complaint   Patient presents with    Follow-up     Betty Results       Subjective     History of Present Illness  Ethan Moses is a 63 y.o. male who presents today for ARIADNE and CAD follow-up. He is taking the Doxazosin as needed.   Patient doesn't want to increase metoprolol due already feeling fatigued. Has checked BP the past three nights and it has been at goal.    He said since last visit his shortness of air and worsening chest discomfort have resolved.  He has no idea why and says he has not had any other changes besides occasionally taking the new doxazosin.  He does not feel like it is made any difference in his blood pressure.  On BB, Plavix, and ASA.    2023 nuclear stress test overall low risk.    PAP machine, mask, and airflow are comfortable and working well.  His insomnia is better with the Trazodone.  We will refill.    Patient reports that the nurse that assisted with his nuclear stress test suggested that he ask me for something for anxiety.  He denies SI/HI.  Will do a trial of Lexapro 10 mg daily.  I would like to see him back in 6 months to follow-up on starting Lexapro.      Cardiology and sleep related problem list    ARIADNE  CAD -S/P stenting  Insomnia  HTN  HLP    2016 baseline AHI of 15    Heart cath at [facility] status post RCA stent 2019, cath 2021 with subtotal PL branch.    2022 bilateral carotid artery duplex less than 50%.    2021 echo sinus rhythm, normal EF, mild concentric left ventricular hypertrophy, left atrium is mildly dilated, aortic valve is trileaflet and mildly thickened, abnormal diastolic function.    2022 Chol 163, tri 289, HDL 34, LDL 81    2022 EKG sinus rhythm    Allergies   Allergen Reactions    Contrast Dye (Echo Or Unknown Ct/Mr) Hives     Contrast Dye (Echo Or Unknown Ct/Mr) Hives       Current Outpatient Medications:     aspirin 81 MG chewable tablet, Chew 1 tablet Daily., Disp: , Rfl:     atorvastatin (LIPITOR) 80 MG tablet, Take 1 tablet by mouth Every Night., Disp: 90 tablet, Rfl: 1    clopidogrel (PLAVIX) 75 MG tablet, Take 1 tablet by mouth Daily., Disp: 90 tablet, Rfl: 1    doxazosin (Cardura) 1 MG tablet, Take 1 tablet by mouth Every Night., Disp: 90 tablet, Rfl: 0    gabapentin (NEURONTIN) 300 MG capsule, Take 1 capsule by mouth 3 (Three) Times a Day., Disp: , Rfl:     lisinopril-hydrochlorothiazide (PRINZIDE,ZESTORETIC) 20-25 MG per tablet, Take 1 tablet by mouth Daily., Disp: 90 tablet, Rfl: 1    metoprolol succinate XL (TOPROL-XL) 50 MG 24 hr tablet, Take 1 tablet by mouth Daily., Disp: 90 tablet, Rfl: 1    nitroglycerin (NITROSTAT) 0.4 MG SL tablet, Place 1 tablet under the tongue Every 5 (Five) Minutes As Needed. Take no more than 3 doses in 15 minutes., Disp: , Rfl:     traZODone (DESYREL) 50 MG tablet, Take 1 tablet by mouth every night at bedtime. Not taking every night due to constipation, Disp: 90 tablet, Rfl: 1    escitalopram (Lexapro) 10 MG tablet, Take 1 tablet by mouth Daily., Disp: 30 tablet, Rfl: 1    Past Medical History:   Diagnosis Date    Acute bronchiolitis, unspecified     Acute nasopharyngitis     Acute respiratory failure with hypoxia     Athscl heart disease of native coronary artery w/o ang pctrs     CAD (coronary artery disease)     status post left heart catheterization 1/24/2019 with Dr. Connolly status post stent placement to the PDA, with a second 90% lesion of a posterior lateral circumflex branch that was not amenable to stent placement    CAD (coronary artery disease)     Chronic diastolic (congestive) heart failure     COVID-19     Diarrhea, unspecified     Dyspnea, unspecified     Gout, unspecified     HLD (hyperlipidemia)     HTN (hypertension)     Hyperlipidemia     Hypertension     Hypoxemia     Insomnia  "due to medical condition     Lipoma of back     Low back pain     Mild intermittent asthma with (acute) exacerbation     Nausea     Non morbid obesity     Obstructive sleep apnea     ARIADNE (obstructive sleep apnea)     Other disorders of lung     Other sleep disorders     Other symptoms and signs involving cognitive functions and awareness     Pain in left foot     Post covid-19 condition, unspecified     Presence of coronary angioplasty implant and graft     Radiculopathy, lumbar region     Right Achilles tendinitis     Sciatica       Past Surgical History:   Procedure Laterality Date    CARDIAC CATHETERIZATION  01/24/2019    with Dr. Connolly status post stent placement to the PDA, with a second 90% lesion of a posterior lateral circumflex branch that was not amenable to stent placement    LIPOMA EXCISION      FROM BACK     Family History   Problem Relation Age of Onset    Prostate cancer Father     Hypertension Father     Breast cancer Sister      Social History     Socioeconomic History    Marital status:    Tobacco Use    Smoking status: Never     Passive exposure: Never    Smokeless tobacco: Never   Vaping Use    Vaping Use: Never used   Substance and Sexual Activity    Alcohol use: Yes     Comment: In the 1980s, and early 1990s, he did use excessive alcohol.  Currently now a weekend drinker, typically 7-8 ounces of bourbon over the weekend    Drug use: Not Currently    Sexual activity: Yes     Partners: Female       Objective     Vital Signs:  /80 (BP Location: Left arm, Patient Position: Sitting, Cuff Size: Adult)   Pulse 81   Ht 177.8 cm (70\")   Wt 129 kg (283 lb 8 oz)   SpO2 99%   BMI 40.68 kg/m²   Estimated body mass index is 40.68 kg/m² as calculated from the following:    Height as of this encounter: 177.8 cm (70\").    Weight as of this encounter: 129 kg (283 lb 8 oz).             Physical Exam  Vitals reviewed.   Constitutional:       Appearance: Normal appearance.   Eyes:      Pupils: " Pupils are equal, round, and reactive to light.   Neck:      Vascular: No carotid bruit.   Cardiovascular:      Rate and Rhythm: Normal rate and regular rhythm.      Pulses: Normal pulses.      Heart sounds: Normal heart sounds.   Pulmonary:      Effort: Pulmonary effort is normal.      Breath sounds: Normal breath sounds.   Musculoskeletal:         General: Normal range of motion.      Right lower leg: No edema.      Left lower leg: No edema.   Skin:     General: Skin is warm and dry.   Neurological:      Mental Status: He is alert and oriented to person, place, and time.      Gait: Gait is intact.   Psychiatric:         Attention and Perception: Attention normal.         Mood and Affect: Mood normal.         Behavior: Behavior normal.                     Assessment and Plan     Diagnoses and all orders for this visit:    1. Coronary artery disease involving native heart without angina pectoris, unspecified vessel or lesion type (Primary)  Comments:  11/2023 low risk stress test.  On medical therapy.    2. Insomnia due to medical condition  Comments:  Refill trazodone  Orders:  -     traZODone (DESYREL) 50 MG tablet; Take 1 tablet by mouth every night at bedtime. Not taking every night due to constipation  Dispense: 90 tablet; Refill: 1    3. ARIADNE (obstructive sleep apnea)  Comments:  Benefiting from PAP therapy.  Plan to continue.    4. MARTÍN (generalized anxiety disorder)  Comments:  Rx Lexapro 10 mg.  May help with chest discomfort    Other orders  -     escitalopram (Lexapro) 10 MG tablet; Take 1 tablet by mouth Daily.  Dispense: 30 tablet; Refill: 1  -     atorvastatin (LIPITOR) 80 MG tablet; Take 1 tablet by mouth Every Night.  Dispense: 90 tablet; Refill: 1  -     clopidogrel (PLAVIX) 75 MG tablet; Take 1 tablet by mouth Daily.  Dispense: 90 tablet; Refill: 1  -     lisinopril-hydrochlorothiazide (PRINZIDE,ZESTORETIC) 20-25 MG per tablet; Take 1 tablet by mouth Daily.  Dispense: 90 tablet; Refill: 1  -      metoprolol succinate XL (TOPROL-XL) 50 MG 24 hr tablet; Take 1 tablet by mouth Daily.  Dispense: 90 tablet; Refill: 1        Recommendations: ER if symptoms increase, Report if any new/changing symptoms immediately, Sleep risks reviewed (driving, medical, sleep death, sedating agents), Sleep hygiene discussed, and Increase pap therapy usage          Follow Up  Return for 6 weeks on med change.  Patient was given instructions and counseling regarding his condition or for health maintenance advice. Please see specific information pulled into the AVS if appropriate.

## 2023-12-01 ENCOUNTER — TELEPHONE (OUTPATIENT)
Dept: FAMILY MEDICINE CLINIC | Facility: CLINIC | Age: 63
End: 2023-12-01
Payer: COMMERCIAL

## 2023-12-01 NOTE — TELEPHONE ENCOUNTER
I have spoke with him and have let him know that he is due for a physical after 12/23/2023. Dr. Mclean wants to wait until his physical to do blood work incase of something needing to be added.   He says he will be out of town the last two weeks of December and will call and make an appt in January. TF

## 2023-12-01 NOTE — TELEPHONE ENCOUNTER
Caller: Ethan Moses    Relationship: Self    Best call back number: [phone]     What orders are you requesting (i.e. lab or imaging): LABS    In what timeframe would the patient need to come in: ASAP    Where will you receive your lab/imaging services: IN OFFICE    Additional notes: CALL WHEN READY

## 2023-12-15 ENCOUNTER — TELEPHONE (OUTPATIENT)
Dept: CARDIOLOGY | Facility: CLINIC | Age: 63
End: 2023-12-15
Payer: COMMERCIAL

## 2023-12-15 NOTE — TELEPHONE ENCOUNTER
Caller: Ethan Moses    Relationship: Self      Date of last appointment: 11.29.2023    Issues/Supplies requested: NEEDS THE CPAP PRESCRIPTION SENT AND ANY AND ALL SUPPLIES    Type of mask (fill or nasal): NASAL    Does equipment use a modem or chip: CHIP    Ordering physician's name: JOSH BHATT    Patient contact information: [phone]    Fax number where the order should be sent: [facility]

## 2024-01-04 RX ORDER — ESCITALOPRAM OXALATE 10 MG/1
10 TABLET ORAL DAILY
Qty: 30 TABLET | Refills: 1 | Status: SHIPPED | OUTPATIENT
Start: 2024-01-04

## 2024-01-10 ENCOUNTER — TELEPHONE (OUTPATIENT)
Dept: CARDIOLOGY | Facility: CLINIC | Age: 64
End: 2024-01-10

## 2024-01-10 NOTE — TELEPHONE ENCOUNTER
AZALIA AT [facility] CALLED TO SAY THAT THIS PT IS NOT ELIGIBLE FOR A NEW BIPAP DEVICE UNTIL 9/2024.  THANKS

## 2024-02-02 RX ORDER — DOXAZOSIN MESYLATE 1 MG/1
1 TABLET ORAL
Qty: 90 TABLET | Refills: 0 | Status: SHIPPED | OUTPATIENT
Start: 2024-02-02

## 2024-05-03 RX ORDER — DOXAZOSIN MESYLATE 1 MG/1
1 TABLET ORAL
Qty: 90 TABLET | Refills: 0 | Status: SHIPPED | OUTPATIENT
Start: 2024-05-03

## 2024-06-28 RX ORDER — LISINOPRIL AND HYDROCHLOROTHIAZIDE 25; 20 MG/1; MG/1
1 TABLET ORAL DAILY
Qty: 90 TABLET | Refills: 1 | Status: SHIPPED | OUTPATIENT
Start: 2024-06-28

## 2024-06-28 RX ORDER — ATORVASTATIN CALCIUM 80 MG/1
80 TABLET, FILM COATED ORAL NIGHTLY
Qty: 90 TABLET | Refills: 1 | Status: SHIPPED | OUTPATIENT
Start: 2024-06-28

## 2024-06-28 NOTE — TELEPHONE ENCOUNTER
Pharmacy requesting refill of Atorvastatin- last lipids done 12/23/22, none done at PCP office, last office visit 11/29/23. Please advise.

## 2024-07-01 RX ORDER — CLOPIDOGREL BISULFATE 75 MG/1
75 TABLET ORAL DAILY
Qty: 90 TABLET | Refills: 1 | Status: SHIPPED | OUTPATIENT
Start: 2024-07-01

## 2024-07-01 RX ORDER — METOPROLOL SUCCINATE 50 MG/1
50 TABLET, EXTENDED RELEASE ORAL DAILY
Qty: 90 TABLET | Refills: 1 | Status: SHIPPED | OUTPATIENT
Start: 2024-07-01

## 2024-08-04 DIAGNOSIS — G47.01 INSOMNIA DUE TO MEDICAL CONDITION: ICD-10-CM

## 2024-08-05 RX ORDER — TRAZODONE HYDROCHLORIDE 50 MG/1
50 TABLET ORAL
Qty: 90 TABLET | Refills: 1 | Status: SHIPPED | OUTPATIENT
Start: 2024-08-05

## 2024-08-05 RX ORDER — DOXAZOSIN MESYLATE 1 MG/1
1 TABLET ORAL
Qty: 90 TABLET | Refills: 0 | OUTPATIENT
Start: 2024-08-05

## 2024-08-22 ENCOUNTER — OFFICE VISIT (OUTPATIENT)
Dept: FAMILY MEDICINE CLINIC | Facility: CLINIC | Age: 64
End: 2024-08-22
Payer: COMMERCIAL

## 2024-08-22 VITALS
SYSTOLIC BLOOD PRESSURE: 134 MMHG | TEMPERATURE: 99.8 F | BODY MASS INDEX: 40.4 KG/M2 | OXYGEN SATURATION: 97 % | WEIGHT: 282.2 LBS | DIASTOLIC BLOOD PRESSURE: 69 MMHG | HEART RATE: 84 BPM | HEIGHT: 70 IN

## 2024-08-22 DIAGNOSIS — M62.838 MUSCLE SPASMS OF NECK: ICD-10-CM

## 2024-08-22 DIAGNOSIS — I10 PRIMARY HYPERTENSION: ICD-10-CM

## 2024-08-22 DIAGNOSIS — M54.2 CERVICALGIA: Primary | ICD-10-CM

## 2024-08-22 DIAGNOSIS — E66.01 MORBID (SEVERE) OBESITY DUE TO EXCESS CALORIES: ICD-10-CM

## 2024-08-22 PROBLEM — G62.9 NEUROPATHY: Status: ACTIVE | Noted: 2023-07-10

## 2024-08-22 PROBLEM — F10.21 HISTORY OF ALCOHOLISM: Status: ACTIVE | Noted: 2023-07-10

## 2024-08-22 PROBLEM — M48.061 SPINAL STENOSIS OF LUMBAR REGION: Status: ACTIVE | Noted: 2023-07-10

## 2024-08-22 PROCEDURE — 99214 OFFICE O/P EST MOD 30 MIN: CPT | Performed by: INTERNAL MEDICINE

## 2024-08-22 RX ORDER — TIZANIDINE 4 MG/1
4 TABLET ORAL EVERY 8 HOURS PRN
Qty: 21 TABLET | Refills: 0 | Status: SHIPPED | OUTPATIENT
Start: 2024-08-22

## 2024-08-22 NOTE — ASSESSMENT & PLAN NOTE
Onset 3 days ago of acute cervicalgia consistent with muscular spasm, etiology uncertain with no known trauma or unusual physical strain.  No history or physical findings that would be consistent of a radiculopathy.  Treat symptomatically with Zanaflex, Motrin or Tylenol, muscle rub cream, massage, heat or ice, and stretching exercises.  Advise if not improving over several days or for any acute worsening of symptoms in the interim

## 2024-08-22 NOTE — PROGRESS NOTES
Follow Up Office Visit      Date: 2024   Patient Name: Ethan Moses  : 1960   MRN: [id number]     Chief Complaint:    Chief Complaint   Patient presents with    Neck Pain     Sick with cough, congestion, fatigue, body aches since last week       History of Present Illness: Ethan Moses is a 64 y.o. male who is here today for onset 3 days ago significant left-sided progression of right sided primarily posterior neck pain and stiffness, occurring with any attempt to move his neck, no known trauma, really has not changed since onset.  There is no radicular pain into his arms nor down his back.  He relates a similar episode occurring about 2 or 3 years ago that resolved after 1 or 2 days.  Notes 8 days ago developing symptoms of sweats, chills, appetite suppression with rhinorrhea, nasal congestion, ocular drainage, malaise, headaches and myalgias but no cough.  Those symptoms gradually subsided over the next several days, functionally ending the day before development of the cervicalgia and stiffness of his neck as noted above.  History of hypertension with blood pressure today normal taking metoprolol and lisinopril/HCTZ..    Subjective      Review of Systems:   Review of Systems    I have reviewed the patients family history, social history, past medical history, past surgical history and have updated it as appropriate.     Medications:     Current Outpatient Medications:     aspirin 81 MG chewable tablet, Chew 1 tablet Daily., Disp: , Rfl:     atorvastatin (LIPITOR) 80 MG tablet, Take 1 tablet by mouth Every Night., Disp: 90 tablet, Rfl: 1    clopidogrel (PLAVIX) 75 MG tablet, Take 1 tablet by mouth Daily., Disp: 90 tablet, Rfl: 1    gabapentin (NEURONTIN) 300 MG capsule, Take 1 capsule by mouth 3 (Three) Times a Day., Disp: , Rfl:     lisinopril-hydrochlorothiazide (PRINZIDE,ZESTORETIC) 20-25 MG per tablet, Take 1 tablet by mouth Daily., Disp: 90 tablet, Rfl: 1    metoprolol succinate XL  "(TOPROL-XL) 50 MG 24 hr tablet, Take 1 tablet by mouth Daily., Disp: 90 tablet, Rfl: 1    nitroglycerin (NITROSTAT) 0.4 MG SL tablet, Place 1 tablet under the tongue Every 5 (Five) Minutes As Needed. Take no more than 3 doses in 15 minutes., Disp: , Rfl:     traZODone (DESYREL) 50 MG tablet, TAKE 1 TABLET BY MOUTH EVERY NIGHT AT BEDTIME. NOT TAKING EVERY NIGHT DUE TO CONSTIPATION, Disp: 90 tablet, Rfl: 1    tiZANidine (Zanaflex) 4 MG tablet, Take 1 tablet by mouth Every 8 (Eight) Hours As Needed for Muscle Spasms., Disp: 21 tablet, Rfl: 0    Allergies:   Allergies   Allergen Reactions    Contrast Dye (Echo Or Unknown Ct/Mr) Hives    Contrast Dye (Echo Or Unknown Ct/Mr) Hives       Objective     Physical Exam: Please see above  Vital Signs:   Vitals:    08/22/24 1343   BP: 134/69   BP Location: Left arm   Patient Position: Sitting   Cuff Size: Adult   Pulse: 84   Temp: 99.8 °F (37.7 °C)   TempSrc: Temporal   SpO2: 97%   Weight: 128 kg (282 lb 3.2 oz)   Height: 177.8 cm (70\")     Body mass index is 40.49 kg/m².  Class 3 Severe Obesity (BMI >=40). Obesity-related health conditions include the following: obstructive sleep apnea, hypertension, coronary heart disease, and dyslipidemias. Obesity is unchanged. BMI is is above average; BMI management plan is completed. We discussed portion control and increasing exercise.       Physical Exam  Constitutional:       General: He is in acute distress.      Appearance: Normal appearance. He is obese. He is not ill-appearing.      Comments: Pleasant not acutely ill-appearing 64-year-old male with significant neck stiffness with little ability to rotate in any direction, otherwise no acute distress, BMI 40.4   HENT:      Head: Normocephalic and atraumatic.      Right Ear: Tympanic membrane and ear canal normal.      Left Ear: Tympanic membrane and ear canal normal.      Nose: No congestion or rhinorrhea.      Mouth/Throat:      Mouth: Mucous membranes are moist.      Pharynx: " Oropharynx is clear. No oropharyngeal exudate or posterior oropharyngeal erythema.   Neck:      Comments: Significant tenderness palpation diffusely on his posterior cervical strap muscles down to the base of his neck but not extending down the back or towards the shoulders on either side.  Has pain to minimal rotation both left and right, more ability to extend and flex his neck but still not near his baseline  Cardiovascular:      Rate and Rhythm: Normal rate and regular rhythm.      Heart sounds: Normal heart sounds. No murmur heard.     No friction rub. No gallop.   Pulmonary:      Effort: Pulmonary effort is normal. No respiratory distress.      Breath sounds: Normal breath sounds.   Musculoskeletal:      Cervical back: Rigidity and tenderness present.   Lymphadenopathy:      Cervical: No cervical adenopathy.   Neurological:      General: No focal deficit present.      Mental Status: He is alert.   Psychiatric:         Mood and Affect: Mood normal.         Procedures    Results:   Labs:   Hemoglobin A1C   Date Value Ref Range Status   12/23/2022 6.0 (H) 4.8 - 5.6 % Final     Comment:              Prediabetes: 5.7 - 6.4           Diabetes: >6.4           Glycemic control for adults with diabetes: <7.0       TSH   Date Value Ref Range Status   12/23/2022 2.830 0.450 - 4.500 uIU/mL Final        POCT Results (if applicable):   Results for orders placed or performed in visit on 12/23/22   Uric Acid    Specimen: Arm, Left; Blood    Blood  Release to krystian   Result Value Ref Range    Uric Acid 8.1 3.8 - 8.4 mg/dL   PSA Screen    Specimen: Arm, Left; Blood    Blood  Release to krystian   Result Value Ref Range    PSA 2.5 0.0 - 4.0 ng/mL   Folate    Specimen: Arm, Left; Blood    Blood  Release to krystian   Result Value Ref Range    Folate 16.1 >3.0 ng/mL   Vitamin B12    Specimen: Arm, Left; Blood    Blood  Release to krystian   Result Value Ref Range    Vitamin B-12 350 232 - 1,245 pg/mL   Hemoglobin A1c    Specimen: Arm, Left;  Blood    Blood  Release to krystian   Result Value Ref Range    Hemoglobin A1C 6.0 (H) 4.8 - 5.6 %   HIV-1 / O / 2 Ag / Antibody 4th Generation    Specimen: Arm, Left; Blood    Blood  Release to krystian   Result Value Ref Range    HIV Screen 4th Gen w/RFX (Reference) Non Reactive Non Reactive   Hepatitis C Antibody    Specimen: Arm, Left; Blood    Blood  Release to krystian   Result Value Ref Range    Hep C Virus Ab <0.1 0.0 - 0.9 s/co ratio   Lipid Panel    Specimen: Arm, Left; Blood    Blood  Release to krystian   Result Value Ref Range    Total Cholesterol 163 100 - 199 mg/dL    Triglycerides 289 (H) 0 - 149 mg/dL    HDL Cholesterol 34 (L) >39 mg/dL    VLDL Cholesterol Irving 48 (H) 5 - 40 mg/dL    LDL Chol Calc (NIH) 81 0 - 99 mg/dL   Urinalysis With Culture If Indicated - Urine, Clean Catch    Specimen: Urine, Clean Catch    Urine   Result Value Ref Range    Specific Gravity, UA 1.019 1.005 - 1.030    pH, UA 5.0 5.0 - 7.5    Color, UA Yellow Yellow    Appearance, UA Clear Clear    Leukocytes, UA Negative Negative    Protein Negative Negative/Trace    Glucose, UA Negative Negative    Ketones Negative Negative    Blood, UA Negative Negative    Bilirubin, UA Negative Negative    Urobilinogen, UA 0.2 0.2 - 1.0 mg/dL    Nitrite, UA Negative Negative    Microscopic Examination Comment     Microscopic Examination See below:     Urinalysis Reflex Comment    Comprehensive Metabolic Panel    Specimen: Arm, Left; Blood    Blood  Release to krystian   Result Value Ref Range    Glucose 113 (H) 70 - 99 mg/dL    BUN 18 8 - 27 mg/dL    Creatinine 1.34 (H) 0.76 - 1.27 mg/dL    EGFR Result 59 (L) >59 mL/min/1.73    BUN/Creatinine Ratio 13 10 - 24    Sodium 141 134 - 144 mmol/L    Potassium 4.3 3.5 - 5.2 mmol/L    Chloride 101 96 - 106 mmol/L    Total CO2 22 20 - 29 mmol/L    Calcium 9.7 8.6 - 10.2 mg/dL    Total Protein 7.5 6.0 - 8.5 g/dL    Albumin 5.0 (H) 3.8 - 4.8 g/dL    Globulin 2.5 1.5 - 4.5 g/dL    A/G Ratio 2.0 1.2 - 2.2    Total Bilirubin  0.7 0.0 - 1.2 mg/dL    Alkaline Phosphatase 84 44 - 121 IU/L    AST (SGOT) 26 0 - 40 IU/L    ALT (SGPT) 27 0 - 44 IU/L   TSH Rfx On Abnormal To Free T4    Specimen: Arm, Left; Blood    Blood  Release to krystian   Result Value Ref Range    TSH 2.830 0.450 - 4.500 uIU/mL   Microscopic Examination -    Urine   Result Value Ref Range    WBC, UA None seen 0 - 5 /hpf    RBC, UA None seen 0 - 2 /hpf    Epithelial Cells (non renal) None seen 0 - 10 /hpf    Casts None seen None seen /lpf    Bacteria, UA None seen None seen/Few   KAMLESH + PE    Blood  Release to krystian   Result Value Ref Range    IgG 997 603 - 1,613 mg/dL    IgA 343 61 - 437 mg/dL    IgM 37 20 - 172 mg/dL    Total Protein 7.4 6.0 - 8.5 g/dL    Albumin 3.9 2.9 - 4.4 g/dL    Alpha-1-Globulin 0.3 0.0 - 0.4 g/dL    Alpha-2-Globulin 1.0 0.4 - 1.0 g/dL    Beta Globulin 1.4 (H) 0.7 - 1.3 g/dL    Gamma Globulin 0.9 0.4 - 1.8 g/dL    M-Héctor Not Observed Not Observed g/dL    Globulin 3.5 2.2 - 3.9 g/dL    A/G Ratio 1.2 0.7 - 1.7    Immunofixation Reflex, Serum Comment     Please note Comment    Protein Elec + Interp, Serum    Blood  Release to krystian   Result Value Ref Range    Albumin CANCELED     Alpha-1-Globulin CANCELED     Alpha-2-Globulin CANCELED     Beta Globulin CANCELED     Gamma Globulin CANCELED     Please note Comment    Methylmalonic Acid, Serum    Blood  Release to krystian   Result Value Ref Range    Methylmalonic Acid 305 0 - 378 nmol/L   CBC & Differential    Specimen: Arm, Left; Blood    Blood  Release to krystian   Result Value Ref Range    WBC 10.7 3.4 - 10.8 x10E3/uL    RBC 5.10 4.14 - 5.80 x10E6/uL    Hemoglobin 15.7 13.0 - 17.7 g/dL    Hematocrit 46.4 37.5 - 51.0 %    MCV 91 79 - 97 fL    MCH 30.8 26.6 - 33.0 pg    MCHC 33.8 31.5 - 35.7 g/dL    RDW 12.4 11.6 - 15.4 %    Platelets 274 150 - 450 x10E3/uL    Neutrophil Rel % 62 Not Estab. %    Lymphocyte Rel % 24 Not Estab. %    Monocyte Rel % 9 Not Estab. %    Eosinophil Rel % 3 Not Estab. %    Basophil Rel % 1  Not Estab. %    Neutrophils Absolute 6.8 1.4 - 7.0 x10E3/uL    Lymphocytes Absolute 2.6 0.7 - 3.1 x10E3/uL    Monocytes Absolute 0.9 0.1 - 0.9 x10E3/uL    Eosinophils Absolute 0.3 0.0 - 0.4 x10E3/uL    Basophils Absolute 0.1 0.0 - 0.2 x10E3/uL    Immature Granulocyte Rel % 1 Not Estab. %    Immature Grans Absolute 0.1 0.0 - 0.1 x10E3/uL       Imaging:   No valid procedures specified.     Assessment / Plan      Assessment/Plan:   Diagnoses and all orders for this visit:    1. Cervicalgia (Primary)  Assessment & Plan:  Onset 3 days ago of acute cervicalgia consistent with muscular spasm, etiology uncertain with no known trauma or unusual physical strain.  No history or physical findings that would be consistent of a radiculopathy.  Treat symptomatically with Zanaflex, Motrin or Tylenol, muscle rub cream, massage, heat or ice, and stretching exercises.  Advise if not improving over several days or for any acute worsening of symptoms in the interim    Orders:  -     tiZANidine (Zanaflex) 4 MG tablet; Take 1 tablet by mouth Every 8 (Eight) Hours As Needed for Muscle Spasms.  Dispense: 21 tablet; Refill: 0    2. Muscle spasms of neck  Assessment & Plan:  Onset 3 days ago of acute cervicalgia consistent with muscular spasm, etiology uncertain with no known trauma or unusual physical strain.  No history or physical findings that would be consistent of a radiculopathy.  Treat symptomatically with Zanaflex, Motrin or Tylenol, muscle rub cream, massage, heat or ice, and stretching exercises.  Advise if not improving over several days or for any acute worsening of symptoms in the interim         3. Primary hypertension  Assessment & Plan:  Satisfactory blood pressure control acutely as well as by history chronically taking lisinopril/HCTZ 20/25 mg daily and metoprolol XL 50 mg daily.  Continue current regimen with monitoring.      4. Morbid (severe) obesity due to excess calories  Assessment & Plan:  Longstanding struggle with  obesity.  Will discuss at follow-up visit recommend complete physical treatment options including possibility GLP-1 agonist.  Pursue healthy lifestyle with diet and exercise          Follow Up:   Return in about 3 months (around 11/22/2024) for Annual physical.      At [facility], we believe that sharing information builds trust and better relationships. You are receiving this note because you recently visited [facility]. It is possible you will see health information before a provider has talked with you about it. This kind of information can be easy to misunderstand. To help you fully understand what it means for your health, we urge you to discuss this note with your provider.    Vinay Ramesh MD  [facility]

## 2024-08-22 NOTE — ASSESSMENT & PLAN NOTE
Longstanding struggle with obesity.  Will discuss at follow-up visit recommend complete physical treatment options including possibility GLP-1 agonist.  Pursue healthy lifestyle with diet and exercise

## 2024-08-22 NOTE — ASSESSMENT & PLAN NOTE
Satisfactory blood pressure control acutely as well as by history chronically taking lisinopril/HCTZ 20/25 mg daily and metoprolol XL 50 mg daily.  Continue current regimen with monitoring.

## 2024-12-20 ENCOUNTER — TELEPHONE (OUTPATIENT)
Dept: CARDIOLOGY | Facility: CLINIC | Age: 64
End: 2024-12-20
Payer: COMMERCIAL

## 2024-12-20 RX ORDER — ATORVASTATIN CALCIUM 80 MG/1
80 TABLET, FILM COATED ORAL NIGHTLY
Qty: 30 TABLET | Refills: 0 | Status: SHIPPED | OUTPATIENT
Start: 2024-12-20

## 2024-12-20 RX ORDER — METOPROLOL SUCCINATE 50 MG/1
50 TABLET, EXTENDED RELEASE ORAL DAILY
Qty: 30 TABLET | Refills: 0 | Status: SHIPPED | OUTPATIENT
Start: 2024-12-20

## 2024-12-20 RX ORDER — LISINOPRIL AND HYDROCHLOROTHIAZIDE 20; 25 MG/1; MG/1
1 TABLET ORAL DAILY
Qty: 30 TABLET | Refills: 0 | Status: SHIPPED | OUTPATIENT
Start: 2024-12-20

## 2024-12-20 RX ORDER — NITROGLYCERIN 0.4 MG/1
0.4 TABLET SUBLINGUAL
Qty: 25 TABLET | Refills: 0 | Status: SHIPPED | OUTPATIENT
Start: 2024-12-20

## 2024-12-20 RX ORDER — CLOPIDOGREL BISULFATE 75 MG/1
75 TABLET ORAL DAILY
Qty: 30 TABLET | Refills: 0 | Status: SHIPPED | OUTPATIENT
Start: 2024-12-20

## 2024-12-20 NOTE — TELEPHONE ENCOUNTER
PATIENT HAS NOT HAD ANY BLOOD WORK DONE IN THE LAST YEAR. PATIENT IS CURRENTLY IN [place] ON VACATION WITH FAMILY AND WILL RUN OUT OF MEDICATION BEFORE HE IS ABLE TO RETURN. OKAY TO SEND IN MONTH SUPPLY UNTIL PATIENTS APPT ON 1/14?  THANK YOU

## 2024-12-20 NOTE — TELEPHONE ENCOUNTER
[facility] IS REQUESTING REFILLS FOR PATIENT, WE HAVENT SEEN PATIENT SINCE LAST NOV. PATIENT WILL NEED AN APPT BEFORE WE CAN SEND ANY REFILLS IN TO PHARMACY.     CAN WE CALL PATIENT AND GET HIM IN FOR AN APPT WITH [facility]? THANKS.

## 2024-12-20 NOTE — TELEPHONE ENCOUNTER
PT is in [place] for holidays.  Scheduled appointment for January 14, 2025 when PT returns.  PT is requesting a small refill be sent in to get him through.  PT stated he had about 2 weeks of medications left.    PT requests refills be sent to [facility] [id number], [place] [address]. [phone].

## 2025-01-13 RX ORDER — CLOPIDOGREL BISULFATE 75 MG/1
75 TABLET ORAL DAILY
Qty: 90 TABLET | Refills: 1 | Status: SHIPPED | OUTPATIENT
Start: 2025-01-13

## 2025-01-13 RX ORDER — LISINOPRIL AND HYDROCHLOROTHIAZIDE 20; 25 MG/1; MG/1
1 TABLET ORAL DAILY
Qty: 90 TABLET | Refills: 1 | Status: SHIPPED | OUTPATIENT
Start: 2025-01-13

## 2025-01-13 RX ORDER — METOPROLOL SUCCINATE 50 MG/1
50 TABLET, EXTENDED RELEASE ORAL DAILY
Qty: 90 TABLET | Refills: 1 | Status: SHIPPED | OUTPATIENT
Start: 2025-01-13

## 2025-01-15 RX ORDER — ATORVASTATIN CALCIUM 80 MG/1
80 TABLET, FILM COATED ORAL
Qty: 30 TABLET | Refills: 0 | OUTPATIENT
Start: 2025-01-15

## 2025-02-26 ENCOUNTER — OFFICE VISIT (OUTPATIENT)
Dept: CARDIOLOGY | Facility: CLINIC | Age: 65
End: 2025-02-26
Payer: COMMERCIAL

## 2025-02-26 VITALS
BODY MASS INDEX: 40.13 KG/M2 | HEIGHT: 70 IN | WEIGHT: 280.3 LBS | HEART RATE: 75 BPM | OXYGEN SATURATION: 97 % | SYSTOLIC BLOOD PRESSURE: 144 MMHG | DIASTOLIC BLOOD PRESSURE: 76 MMHG

## 2025-02-26 DIAGNOSIS — I25.10 CORONARY ARTERY DISEASE INVOLVING NATIVE CORONARY ARTERY OF NATIVE HEART WITHOUT ANGINA PECTORIS: ICD-10-CM

## 2025-02-26 DIAGNOSIS — E78.5 HYPERLIPIDEMIA LDL GOAL <70: ICD-10-CM

## 2025-02-26 DIAGNOSIS — F41.1 GAD (GENERALIZED ANXIETY DISORDER): ICD-10-CM

## 2025-02-26 DIAGNOSIS — G47.33 OSA (OBSTRUCTIVE SLEEP APNEA): ICD-10-CM

## 2025-02-26 DIAGNOSIS — E66.01 MORBID (SEVERE) OBESITY DUE TO EXCESS CALORIES: ICD-10-CM

## 2025-02-26 DIAGNOSIS — G47.01 INSOMNIA DUE TO MEDICAL CONDITION: ICD-10-CM

## 2025-02-26 DIAGNOSIS — I10 PRIMARY HYPERTENSION: ICD-10-CM

## 2025-02-26 RX ORDER — ATORVASTATIN CALCIUM 80 MG/1
80 TABLET, FILM COATED ORAL NIGHTLY
Qty: 30 TABLET | Refills: 3 | Status: SHIPPED | OUTPATIENT
Start: 2025-02-26

## 2025-02-26 RX ORDER — CLOPIDOGREL BISULFATE 75 MG/1
75 TABLET ORAL DAILY
Qty: 90 TABLET | Refills: 3 | Status: SHIPPED | OUTPATIENT
Start: 2025-02-26

## 2025-02-26 RX ORDER — NITROGLYCERIN 0.4 MG/1
0.4 TABLET SUBLINGUAL
Qty: 25 TABLET | Refills: 0 | Status: SHIPPED | OUTPATIENT
Start: 2025-02-26

## 2025-02-26 RX ORDER — LISINOPRIL AND HYDROCHLOROTHIAZIDE 20; 25 MG/1; MG/1
1 TABLET ORAL DAILY
Qty: 90 TABLET | Refills: 3 | Status: SHIPPED | OUTPATIENT
Start: 2025-02-26

## 2025-02-26 RX ORDER — TRAZODONE HYDROCHLORIDE 50 MG/1
50 TABLET ORAL
Qty: 90 TABLET | Refills: 3 | Status: SHIPPED | OUTPATIENT
Start: 2025-02-26

## 2025-02-26 RX ORDER — TIRZEPATIDE 2.5 MG/.5ML
2.5 INJECTION, SOLUTION SUBCUTANEOUS WEEKLY
Qty: 2 ML | Refills: 0 | Status: SHIPPED | OUTPATIENT
Start: 2025-02-26

## 2025-02-26 RX ORDER — METOPROLOL SUCCINATE 50 MG/1
50 TABLET, EXTENDED RELEASE ORAL DAILY
Qty: 90 TABLET | Refills: 3 | Status: SHIPPED | OUTPATIENT
Start: 2025-02-26

## 2025-02-26 NOTE — ASSESSMENT & PLAN NOTE
Given his ARIADNE and CAD status post stenting he is interested in trying a G1P.  He is aware insurance may not cover it or that the out-of-pocket cost may be unreasonable.  Will send in Mounjaro.

## 2025-02-26 NOTE — ASSESSMENT & PLAN NOTE
Benefiting from PAP therapy.  Plan to continue.  Will also do a trial of Mounjaro and he wants to try different mask type.

## 2025-02-26 NOTE — PROGRESS NOTES
"         Date:   2025   Name: Ethan Moses  :   1960  PCP: Vinay Ramesh MD  REF:    No ref. provider found     [facility] Provider Note         ..Coronary Artery Disease (Patient request refills for all of his medicine. ) and Sleep Apnea (Pt thinks he needs a new mask and would like to try a nasal pillow mask. States that he feels like he is not getting \"restful sleep\")         History of Present Illness    Ethan Moses is a 64 y.o. male who presents today for follow-up on CAD and ARIADNE.  He has coexisting hypertension that he says is better at home.  He has not taken his doxazosin.  He also stopped the Lexapro 10 mg that we started for MARTÍN as he said it did not help.    He thinks part of his blood pressure elevation is related to right shoulder pain.  He is getting ready to get an opinion from neurosurgery to see if they feel he needs surgery.    He denies any chest pain, SOA, edema, palpitations, or syncope.  He does need refills on his medications and he is asking about doing a trial of Wegovy/Ozempic since he has coronary artery disease with stenting and ARIADNE.    He reports his machine, airflow, mask are comfortable and working well but he would like to try a nasal pillow mask.  He is not getting a lot of restful sleep but he is under a lot of stress and anxiety with some issues going on with one of his daughters in [place].    I prefer Mounjaro for CAD, ARIADNE, and obesity.  Will send into pharmacy to see if it is covered.  Patient is aware that it may not be covered or be reasonably priced even with coverage.    We will plan on seeing her back in 3 months.  Sooner if needed.      Harrington Scale is (out of 24):         The 10-year ASCVD risk score (Bebeto BOSTON, et al., 2019) is: 20.7%    Values used to calculate the score:      Age: 64 years      Sex: Male      Is Non- : No      Diabetic: No      Tobacco smoker: No      Systolic Blood Pressure: 144 " mmHg      Is BP treated: Yes      HDL Cholesterol: 34 mg/dL      Total Cholesterol: 163 mg/dL     Cardiology and Sleep Related History:    Cardiology and sleep related problem list    ARIADNE  CAD -S/P stenting  Insomnia  HTN  HLP    October 29, 2016 baseline AHI of 15    Heart [facility] at [facility] status post RCA stent January 24, 2019, cath December 2021 with subtotal PL branch.    July 7, 2022 bilateral carotid artery duplex less than 50%.    October 21, 2021 echo sinus rhythm, normal EF, mild concentric left ventricular hypertrophy, left atrium is mildly dilated, aortic valve is trileaflet and mildly thickened, abnormal diastolic function.    12/23/2022 Chol 163, tri 289, HDL 34, LDL 81    December 12, 2022 EKG sinus rhythm              Medications Discontinued During This Encounter   Medication Reason    tiZANidine (Zanaflex) 4 MG tablet *Therapy completed    traZODone (DESYREL) 50 MG tablet     atorvastatin (LIPITOR) 80 MG tablet     nitroglycerin (NITROSTAT) 0.4 MG SL tablet     clopidogrel (PLAVIX) 75 MG tablet     lisinopril-hydrochlorothiazide (PRINZIDE,ZESTORETIC) 20-25 MG per tablet     metoprolol succinate XL (TOPROL-XL) 50 MG 24 hr tablet         Allergies   Allergen Reactions    Contrast Dye (Echo Or Unknown Ct/Mr) Hives    Contrast Dye (Echo Or Unknown Ct/Mr) Hives         Current Outpatient Medications:     aspirin 81 MG chewable tablet, Chew 1 tablet Daily., Disp: , Rfl:     atorvastatin (LIPITOR) 80 MG tablet, Take 1 tablet by mouth Every Night., Disp: 30 tablet, Rfl: 3    clopidogrel (PLAVIX) 75 MG tablet, Take 1 tablet by mouth Daily., Disp: 90 tablet, Rfl: 3    gabapentin (NEURONTIN) 300 MG capsule, Take 1 capsule by mouth 3 (Three) Times a Day., Disp: , Rfl:     lisinopril-hydrochlorothiazide (PRINZIDE,ZESTORETIC) 20-25 MG per tablet, Take 1 tablet by mouth Daily., Disp: 90 tablet, Rfl: 3    metoprolol succinate XL (TOPROL-XL) 50 MG 24 hr tablet, Take 1 tablet by mouth Daily., Disp: 90 tablet, Rfl: 3     nitroglycerin (NITROSTAT) 0.4 MG SL tablet, Place 1 tablet under the tongue Every 5 (Five) Minutes As Needed for Chest Pain. Take no more than 3 doses in 15 minutes., Disp: 25 tablet, Rfl: 0    traZODone (DESYREL) 50 MG tablet, Take 1 tablet by mouth every night at bedtime. Not taking every night due to constipation, Disp: 90 tablet, Rfl: 3    Tirzepatide (Mounjaro) 2.5 MG/0.5ML solution auto-injector, Inject 2.5 mg under the skin into the appropriate area as directed 1 (One) Time Per Week., Disp: 2 mL, Rfl: 0    Past Medical History:   Diagnosis Date    Acute bronchiolitis, unspecified     Acute nasopharyngitis     Acute respiratory failure with hypoxia     Athscl heart disease of native coronary artery w/o ang pctrs     CAD (coronary artery disease)     status post left heart catheterization 1/24/2019 with Dr. Connolly status post stent placement to the PDA, with a second 90% lesion of a posterior lateral circumflex branch that was not amenable to stent placement    CAD (coronary artery disease)     Chronic diastolic (congestive) heart failure     COVID-19     Diarrhea, unspecified     Dyspnea, unspecified     Gout, unspecified     HLD (hyperlipidemia)     HTN (hypertension)     Hyperlipidemia     Hypertension     Hypoxemia     Insomnia due to medical condition     Lipoma of back     Low back pain     Mild intermittent asthma with (acute) exacerbation     Nausea     Non morbid obesity     Obstructive sleep apnea     ARIADNE (obstructive sleep apnea)     Other disorders of lung     Other sleep disorders     Other symptoms and signs involving cognitive functions and awareness     Pain in left foot     Post covid-19 condition, unspecified     Presence of coronary angioplasty implant and graft     Radiculopathy, lumbar region     Right Achilles tendinitis     Sciatica           Patient Active Problem List   Diagnosis    Hyperlipidemia LDL goal <70    HTN (hypertension)    Insomnia due to medical condition    Lipoma of back     "Low back pain    Other symptoms and signs involving cognitive functions and awareness    Post covid-19 condition, unspecified    Stage 3a chronic kidney disease    Morbid (severe) obesity due to excess calories    History of gout    Peripheral polyneuropathy    BPH associated with nocturia    ARIADNE (obstructive sleep apnea)    CAD (coronary artery disease)    MARTÍN (generalized anxiety disorder)    Cervicalgia    Muscle spasms of neck    History of alcoholism    Neuropathy    Spinal stenosis of lumbar region        Family History   Problem Relation Age of Onset    Prostate cancer Father     Hypertension Father     Breast cancer Sister          family history includes Breast cancer in his sister; Hypertension in his father; Prostate cancer in his father.     Social History     Socioeconomic History    Marital status:    Tobacco Use    Smoking status: Never     Passive exposure: Never    Smokeless tobacco: Never   Vaping Use    Vaping status: Never Used   Substance and Sexual Activity    Alcohol use: Yes     Comment: In the 1980s, and early 1990s, he did use excessive alcohol.  Currently now a weekend drinker, typically 7-8 ounces of bourbon over the weekend    Drug use: Not Currently    Sexual activity: Yes     Partners: Female             Vital Signs:  /76 (BP Location: Right arm, Patient Position: Sitting, Cuff Size: Adult)   Pulse 75   Ht 177.8 cm (70\")   Wt 127 kg (280 lb 4.8 oz)   SpO2 97%   BMI 40.22 kg/m²   Vitals:    02/26/25 1243   Patient Position: Sitting      Estimated body mass index is 40.22 kg/m² as calculated from the following:    Height as of this encounter: 177.8 cm (70\").    Weight as of this encounter: 127 kg (280 lb 4.8 oz).     Physical Exam  Vitals reviewed.   Constitutional:       Appearance: Normal appearance. He is well-developed.   HENT:      Head: Normocephalic and atraumatic.   Eyes:      General: No scleral icterus.     Pupils: Pupils are equal, round, and reactive to " light.   Cardiovascular:      Rate and Rhythm: Normal rate and regular rhythm.      Heart sounds: Normal heart sounds. No murmur heard.  Pulmonary:      Breath sounds: Normal breath sounds. No wheezing or rhonchi.   Musculoskeletal:         General: Normal range of motion.      Right lower leg: No edema.      Left lower leg: No edema.   Skin:     General: Skin is warm and dry.      Capillary Refill: Capillary refill takes less than 2 seconds.      Coloration: Skin is not cyanotic.      Nails: There is no clubbing.   Neurological:      Mental Status: He is alert and oriented to person, place, and time.      Motor: No weakness.      Gait: Gait normal.   Psychiatric:         Mood and Affect: Mood normal.         Behavior: Behavior is cooperative.         Thought Content: Thought content normal.         Cognition and Memory: Memory normal.           ECG 12 Lead    Date/Time: 2/26/2025 2:36 PM  Performed by: Love Richter APRN    Authorized by: Love Richter APRN  Comparison: compared with previous ECG from 11/7/2023  Similar to previous ECG  Rhythm: sinus rhythm  BPM: 73    Clinical impression: normal ECG           Assessment and Plan     Diagnoses and all orders for this visit:    1. Coronary artery disease involving native coronary artery of native heart without angina pectoris  Assessment & Plan:  Coronary Artery Disease (OPTIONAL): Coronary artery disease is stable.  Continue current treatment regimen. Continue current medications. Dietary sodium restriction. Weight loss. Regular aerobic exercise.  Cardiac status will be reassessed in 6 months.    Orders:  -     Tirzepatide (Mounjaro) 2.5 MG/0.5ML solution auto-injector; Inject 2.5 mg under the skin into the appropriate area as directed 1 (One) Time Per Week.  Dispense: 2 mL; Refill: 0    2. ARIADNE (obstructive sleep apnea)  Assessment & Plan:  Benefiting from PAP therapy.  Plan to continue.  Will also do a trial of Mounjaro and he wants to try  different mask type.    Orders:  -     Tirzepatide (Mounjaro) 2.5 MG/0.5ML solution auto-injector; Inject 2.5 mg under the skin into the appropriate area as directed 1 (One) Time Per Week.  Dispense: 2 mL; Refill: 0  -     PAP Therapy    3. Morbid (severe) obesity due to excess calories  Assessment & Plan:  Given his ARIADNE and CAD status post stenting he is interested in trying a G1P.  He is aware insurance may not cover it or that the out-of-pocket cost may be unreasonable.  Will send in Mounjaro.    Orders:  -     Tirzepatide (Mounjaro) 2.5 MG/0.5ML solution auto-injector; Inject 2.5 mg under the skin into the appropriate area as directed 1 (One) Time Per Week.  Dispense: 2 mL; Refill: 0    4. Insomnia due to medical condition  Comments:  Refill trazodone  Assessment & Plan:  Refill trazodone    Orders:  -     traZODone (DESYREL) 50 MG tablet; Take 1 tablet by mouth every night at bedtime. Not taking every night due to constipation  Dispense: 90 tablet; Refill: 3    5. MARTÍN (generalized anxiety disorder)  Assessment & Plan:  Psychological condition is worsening.  Continue current treatment regimen.  Psychological condition  will be reassessed in 3 months.    -Not taking Lexapro 10mg; didn't;t feel like it helped.  Told him if he changes his mind we could restart at 10 mg and titrate up to 20 mg.      6. Primary hypertension  Assessment & Plan:  Hypertension is borderline  Continue current treatment regimen.  Dietary sodium restriction.  Weight loss.  Regular aerobic exercise.  Ambulatory blood pressure monitoring.  Blood pressure will be reassessedin 3 months.      7. Hyperlipidemia LDL goal <70  Assessment & Plan:   Lipid abnormalities are stable    Plan:  Continue same medication/s without change.      Discussed medication dosage, use, side effects, and goals of treatment in detail.    Counseled patient on lifestyle modifications to help control hyperlipidemia.   Advised patient to exercise for 150 minutes weekly.  (30 minute brisk walk, 5 days a week for example)  Weight Loss encouraged    Patient Treatment Goals:   LDL goal is less than 70    Followup in 6 months.      Other orders  -     atorvastatin (LIPITOR) 80 MG tablet; Take 1 tablet by mouth Every Night.  Dispense: 30 tablet; Refill: 3  -     nitroglycerin (NITROSTAT) 0.4 MG SL tablet; Place 1 tablet under the tongue Every 5 (Five) Minutes As Needed for Chest Pain. Take no more than 3 doses in 15 minutes.  Dispense: 25 tablet; Refill: 0  -     clopidogrel (PLAVIX) 75 MG tablet; Take 1 tablet by mouth Daily.  Dispense: 90 tablet; Refill: 3  -     lisinopril-hydrochlorothiazide (PRINZIDE,ZESTORETIC) 20-25 MG per tablet; Take 1 tablet by mouth Daily.  Dispense: 90 tablet; Refill: 3  -     metoprolol succinate XL (TOPROL-XL) 50 MG 24 hr tablet; Take 1 tablet by mouth Daily.  Dispense: 90 tablet; Refill: 3  -     ECG 12 Lead            Recommendations: ER if symptoms increase, Report if any new/changing symptoms immediately, Limit salt, Elevate legs, Limit caffeine, Sleep risks reviewed (driving, medical, sleep death, sedating agents), Sleep hygiene discussed, Increase pap therapy usage, and ARIADNE precautions for anesthesia    Follow Up  Return for 3 month wegovy/ARIADNE/BP/MARTÍN.    Love Richter, APRN   02/26/2025     Please note that this explicitly excludes time spent on other separate billable services such as performing procedures or test interpretation, when applicable.  This note was created using dictation software which occasionally transcribes nonsensical phrases. Please contact the provider if any clarification is needed.

## 2025-02-26 NOTE — ASSESSMENT & PLAN NOTE
Coronary Artery Disease (OPTIONAL): Coronary artery disease is stable.  Continue current treatment regimen. Continue current medications. Dietary sodium restriction. Weight loss. Regular aerobic exercise.  Cardiac status will be reassessed in 6 months.

## 2025-02-26 NOTE — ASSESSMENT & PLAN NOTE
Hypertension is borderline  Continue current treatment regimen.  Dietary sodium restriction.  Weight loss.  Regular aerobic exercise.  Ambulatory blood pressure monitoring.  Blood pressure will be reassessedin 3 months.

## 2025-02-26 NOTE — ASSESSMENT & PLAN NOTE
Psychological condition is worsening.  Continue current treatment regimen.  Psychological condition  will be reassessed in 3 months.    -Not taking Lexapro 10mg; didn't;t feel like it helped.  Told him if he changes his mind we could restart at 10 mg and titrate up to 20 mg.

## 2025-03-19 ENCOUNTER — TELEPHONE (OUTPATIENT)
Dept: CARDIOLOGY | Facility: CLINIC | Age: 65
End: 2025-03-19
Payer: COMMERCIAL

## 2025-03-19 NOTE — TELEPHONE ENCOUNTER
PT called and LVM regarding a prescription refill.  He was told by pharmacy that it need insurance approval.  He is calling to check status.  PT did not mention name of medication that he is checking on.  PT is asking for a call back with status.

## 2025-03-19 NOTE — TELEPHONE ENCOUNTER
CALLED PATIENT. NO ANSWER LVM TO CALL OFFICE. WILL NEED TO HAVE LABS DRAWN FOR INSURANCE AUTHORIZATION FOR MOUNJARO.

## 2025-03-20 DIAGNOSIS — I25.10 CORONARY ARTERY DISEASE INVOLVING NATIVE HEART WITHOUT ANGINA PECTORIS, UNSPECIFIED VESSEL OR LESION TYPE: ICD-10-CM

## 2025-03-20 DIAGNOSIS — E66.01 MORBID (SEVERE) OBESITY DUE TO EXCESS CALORIES: Primary | ICD-10-CM

## 2025-03-20 DIAGNOSIS — G62.9 PERIPHERAL POLYNEUROPATHY: ICD-10-CM

## 2025-03-20 DIAGNOSIS — G47.33 OSA (OBSTRUCTIVE SLEEP APNEA): ICD-10-CM

## 2025-03-20 NOTE — TELEPHONE ENCOUNTER
SPOKE TO PATIENT ABOUT A1C ORDER. HIS INSURANCE REQUIRES A 6.5 A1C.PT STATES HIS LAST A1C WAS 5.8 SO HE IS NOT SURE HE WILL HAVE THE LABS DONE. HE WILL THINK ABOUT IT AND CALL IF HE DECIDES TO TEST. PATIENT ADVISED THAT THE ORDER IS IN THE SYSTEM TO HAVE DONE AT DR ALFARO OFFICE AND HE WILL NEED TO CALL TO HAVE IT FAXED IF HE CHOOSES TO DO AT THE HOSPITAL. ALSO EXPLAINED TO PATIENT THAT I CAN NOT CONTINUE WITH THE PRIOR AUTH WITHOUT THIS LAB VALUE. PT VERBALIZES UNDERSTANDING AND STATES HE WILL LET ME KNOW.

## 2025-05-06 RX ORDER — ATORVASTATIN CALCIUM 80 MG/1
80 TABLET, FILM COATED ORAL
Qty: 90 TABLET | Refills: 0 | Status: SHIPPED | OUTPATIENT
Start: 2025-05-06

## 2025-05-20 RX ORDER — ATORVASTATIN CALCIUM 80 MG/1
80 TABLET, FILM COATED ORAL
Qty: 30 TABLET | OUTPATIENT
Start: 2025-05-20

## 2025-05-29 ENCOUNTER — TELEPHONE (OUTPATIENT)
Dept: CARDIOLOGY | Facility: CLINIC | Age: 65
End: 2025-05-29
Payer: COMMERCIAL

## 2025-05-29 NOTE — TELEPHONE ENCOUNTER
Caller: Ethan Moses    Relationship: Self    Best call back number: [phone]    What is the best time to reach you: ANY    Who are you requesting to speak with (clinical staff, provider,  specific staff member): CLINICAL    Do you know the name of the person who called: COLTEN LOZANO    What was the call regarding: PATIENT RETURNING A MISSED CALL. NO NOTE IN CHART. PLEASE REACH OUT.    Is it okay if the provider responds through MyChart: CALL

## 2025-05-29 NOTE — TELEPHONE ENCOUNTER
Spoke w/ Pt.  He has not started the Mounjoro injections  because insurance would not approve until blood sugar level reached a certain number.      Pt is currenty in [place] and will be for foreseeable future. Pt will reach out when he back in state to determine next steps.      Pt does not need FU appt / labs at this time due to not doing injections.

## 2025-08-22 ENCOUNTER — OFFICE VISIT (OUTPATIENT)
Dept: FAMILY MEDICINE CLINIC | Facility: CLINIC | Age: 65
End: 2025-08-22
Payer: COMMERCIAL

## 2025-08-22 VITALS
WEIGHT: 283 LBS | OXYGEN SATURATION: 99 % | HEART RATE: 81 BPM | TEMPERATURE: 99.7 F | RESPIRATION RATE: 20 BRPM | HEIGHT: 70 IN | BODY MASS INDEX: 40.52 KG/M2 | SYSTOLIC BLOOD PRESSURE: 170 MMHG | DIASTOLIC BLOOD PRESSURE: 90 MMHG

## 2025-08-22 DIAGNOSIS — M54.41 CHRONIC RIGHT-SIDED LOW BACK PAIN WITH RIGHT-SIDED SCIATICA: Primary | ICD-10-CM

## 2025-08-22 DIAGNOSIS — I25.10 CORONARY ARTERY DISEASE INVOLVING NATIVE HEART WITHOUT ANGINA PECTORIS, UNSPECIFIED VESSEL OR LESION TYPE: ICD-10-CM

## 2025-08-22 DIAGNOSIS — I10 PRIMARY HYPERTENSION: ICD-10-CM

## 2025-08-22 DIAGNOSIS — M54.12 CERVICAL RADICULOPATHY: ICD-10-CM

## 2025-08-22 DIAGNOSIS — G89.29 CHRONIC RIGHT-SIDED LOW BACK PAIN WITH RIGHT-SIDED SCIATICA: Primary | ICD-10-CM

## 2025-08-22 DIAGNOSIS — R73.03 PREDIABETES: ICD-10-CM
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
## 2025-08-22 LAB
EXPIRATION DATE: ABNORMAL
EXPIRATION DATE: NORMAL
GLUCOSE BLDC GLUCOMTR-MCNC: 93 MG/DL (ref 70–130)
HBA1C MFR BLD: 6.1 % (ref 4.5–5.7)
Lab: ABNORMAL
Lab: NORMAL

## 2025-08-22 RX ORDER — LORAZEPAM 0.5 MG/1
TABLET ORAL
Qty: 2 TABLET | Refills: 0 | Status: SHIPPED | OUTPATIENT
Start: 2025-08-22

## 2025-08-22 RX ORDER — GABAPENTIN 600 MG/1
600 TABLET ORAL 3 TIMES DAILY
Qty: 90 TABLET | Refills: 1 | Status: SHIPPED | OUTPATIENT
Start: 2025-08-22

## 2025-08-22 RX ORDER — TIZANIDINE HYDROCHLORIDE 4 MG/1
4 CAPSULE, GELATIN COATED ORAL 3 TIMES DAILY
Qty: 30 CAPSULE | Refills: 1 | Status: SHIPPED | OUTPATIENT
Start: 2025-08-22

## 2025-08-22 RX ORDER — PREDNISONE 20 MG/1
20 TABLET ORAL 2 TIMES DAILY
Qty: 10 TABLET | Refills: 0 | Status: SHIPPED | OUTPATIENT
Start: 2025-08-22 | End: 2025-08-27

## 2025-08-25 ENCOUNTER — TELEPHONE (OUTPATIENT)
Dept: CARDIOLOGY | Facility: CLINIC | Age: 65
End: 2025-08-25
Payer: COMMERCIAL

## 2025-08-25 DIAGNOSIS — Z13.1 SCREENING FOR DIABETES MELLITUS: ICD-10-CM

## 2025-08-25 DIAGNOSIS — Z12.5 SCREENING FOR MALIGNANT NEOPLASM OF PROSTATE: ICD-10-CM

## 2025-08-25 DIAGNOSIS — I10 PRIMARY HYPERTENSION: ICD-10-CM

## 2025-08-25 DIAGNOSIS — E78.2 MIXED HYPERLIPIDEMIA: ICD-10-CM

## 2025-08-25 DIAGNOSIS — I25.10 CORONARY ARTERY DISEASE INVOLVING NATIVE CORONARY ARTERY OF NATIVE HEART WITHOUT ANGINA PECTORIS: Primary | ICD-10-CM

## 2025-08-25 RX ORDER — ATORVASTATIN CALCIUM 80 MG/1
80 TABLET, FILM COATED ORAL
Qty: 90 TABLET | Refills: 0 | OUTPATIENT
Start: 2025-08-25

## 2025-08-26 RX ORDER — ATORVASTATIN CALCIUM 80 MG/1
80 TABLET, FILM COATED ORAL
Qty: 90 TABLET | Refills: 3 | Status: SHIPPED | OUTPATIENT
Start: 2025-08-26

## 2025-08-27 ENCOUNTER — TELEPHONE (OUTPATIENT)
Dept: FAMILY MEDICINE CLINIC | Facility: CLINIC | Age: 65
End: 2025-08-27
Payer: COMMERCIAL

## 2025-08-27 DIAGNOSIS — M54.41 CHRONIC RIGHT-SIDED LOW BACK PAIN WITH RIGHT-SIDED SCIATICA: ICD-10-CM

## 2025-08-27 DIAGNOSIS — G89.29 CHRONIC RIGHT-SIDED LOW BACK PAIN WITH RIGHT-SIDED SCIATICA: ICD-10-CM

## 2025-08-27 DIAGNOSIS — M54.12 CERVICAL RADICULOPATHY: ICD-10-CM

## 2025-08-27 RX ORDER — PREDNISONE 20 MG/1
20 TABLET ORAL 2 TIMES DAILY
Qty: 10 TABLET | Refills: 0 | Status: SHIPPED | OUTPATIENT
Start: 2025-08-27 | End: 2025-09-01

## 2025-08-29 ENCOUNTER — RESULTS FOLLOW-UP (OUTPATIENT)
Dept: FAMILY MEDICINE CLINIC | Facility: CLINIC | Age: 65
End: 2025-08-29
Payer: COMMERCIAL